# Patient Record
Sex: FEMALE | Race: WHITE | Employment: OTHER | URBAN - METROPOLITAN AREA
[De-identification: names, ages, dates, MRNs, and addresses within clinical notes are randomized per-mention and may not be internally consistent; named-entity substitution may affect disease eponyms.]

---

## 2021-03-19 ENCOUNTER — APPOINTMENT (OUTPATIENT)
Dept: GENERAL RADIOLOGY | Age: 71
End: 2021-03-19
Payer: MEDICARE

## 2021-03-19 ENCOUNTER — HOSPITAL ENCOUNTER (EMERGENCY)
Age: 71
Discharge: HOME | End: 2021-03-19
Attending: EMERGENCY MEDICINE
Payer: MEDICARE

## 2021-03-19 VITALS
TEMPERATURE: 97.6 F | HEIGHT: 61 IN | WEIGHT: 195 LBS | OXYGEN SATURATION: 98 % | SYSTOLIC BLOOD PRESSURE: 180 MMHG | BODY MASS INDEX: 36.82 KG/M2 | DIASTOLIC BLOOD PRESSURE: 81 MMHG | RESPIRATION RATE: 16 BRPM | HEART RATE: 74 BPM

## 2021-03-19 DIAGNOSIS — I49.3 SYMPTOMATIC PVCS: ICD-10-CM

## 2021-03-19 DIAGNOSIS — R00.2 PALPITATIONS: Primary | ICD-10-CM

## 2021-03-19 LAB
ALBUMIN SERPL-MCNC: 4.4 G/DL (ref 3.5–5.2)
ALP BLD-CCNC: 105 U/L (ref 35–104)
ALT SERPL-CCNC: 26 U/L (ref 0–32)
ANION GAP SERPL CALCULATED.3IONS-SCNC: 10 MMOL/L (ref 7–16)
AST SERPL-CCNC: 27 U/L (ref 0–31)
BASOPHILS ABSOLUTE: 0.04 E9/L (ref 0–0.2)
BASOPHILS RELATIVE PERCENT: 0.4 % (ref 0–2)
BILIRUB SERPL-MCNC: 0.5 MG/DL (ref 0–1.2)
BUN BLDV-MCNC: 16 MG/DL (ref 8–23)
CALCIUM SERPL-MCNC: 10.4 MG/DL (ref 8.6–10.2)
CHLORIDE BLD-SCNC: 99 MMOL/L (ref 98–107)
CO2: 29 MMOL/L (ref 22–29)
CREAT SERPL-MCNC: 0.7 MG/DL (ref 0.5–1)
EKG ATRIAL RATE: 75 BPM
EKG P AXIS: 42 DEGREES
EKG P-R INTERVAL: 162 MS
EKG Q-T INTERVAL: 398 MS
EKG QRS DURATION: 96 MS
EKG QTC CALCULATION (BAZETT): 444 MS
EKG R AXIS: -4 DEGREES
EKG T AXIS: 14 DEGREES
EKG VENTRICULAR RATE: 75 BPM
EOSINOPHILS ABSOLUTE: 0.14 E9/L (ref 0.05–0.5)
EOSINOPHILS RELATIVE PERCENT: 1.5 % (ref 0–6)
GFR AFRICAN AMERICAN: >60
GFR NON-AFRICAN AMERICAN: >60 ML/MIN/1.73
GLUCOSE BLD-MCNC: 105 MG/DL (ref 74–99)
HCT VFR BLD CALC: 47.6 % (ref 34–48)
HEMOGLOBIN: 15.7 G/DL (ref 11.5–15.5)
IMMATURE GRANULOCYTES #: 0.03 E9/L
IMMATURE GRANULOCYTES %: 0.3 % (ref 0–5)
LYMPHOCYTES ABSOLUTE: 1.72 E9/L (ref 1.5–4)
LYMPHOCYTES RELATIVE PERCENT: 18.1 % (ref 20–42)
MAGNESIUM: 2 MG/DL (ref 1.6–2.6)
MCH RBC QN AUTO: 29.4 PG (ref 26–35)
MCHC RBC AUTO-ENTMCNC: 33 % (ref 32–34.5)
MCV RBC AUTO: 89.1 FL (ref 80–99.9)
MONOCYTES ABSOLUTE: 0.71 E9/L (ref 0.1–0.95)
MONOCYTES RELATIVE PERCENT: 7.5 % (ref 2–12)
NEUTROPHILS ABSOLUTE: 6.86 E9/L (ref 1.8–7.3)
NEUTROPHILS RELATIVE PERCENT: 72.2 % (ref 43–80)
PDW BLD-RTO: 13 FL (ref 11.5–15)
PLATELET # BLD: 276 E9/L (ref 130–450)
PMV BLD AUTO: 10.9 FL (ref 7–12)
POTASSIUM SERPL-SCNC: 4.1 MMOL/L (ref 3.5–5)
RBC # BLD: 5.34 E12/L (ref 3.5–5.5)
SODIUM BLD-SCNC: 138 MMOL/L (ref 132–146)
TOTAL PROTEIN: 7.7 G/DL (ref 6.4–8.3)
TROPONIN: <0.01 NG/ML (ref 0–0.03)
TSH SERPL DL<=0.05 MIU/L-ACNC: 2.67 UIU/ML (ref 0.27–4.2)
WBC # BLD: 9.5 E9/L (ref 4.5–11.5)

## 2021-03-19 PROCEDURE — 93005 ELECTROCARDIOGRAM TRACING: CPT | Performed by: EMERGENCY MEDICINE

## 2021-03-19 PROCEDURE — 99283 EMERGENCY DEPT VISIT LOW MDM: CPT

## 2021-03-19 PROCEDURE — 84443 ASSAY THYROID STIM HORMONE: CPT

## 2021-03-19 PROCEDURE — 85025 COMPLETE CBC W/AUTO DIFF WBC: CPT

## 2021-03-19 PROCEDURE — 84484 ASSAY OF TROPONIN QUANT: CPT

## 2021-03-19 PROCEDURE — 36415 COLL VENOUS BLD VENIPUNCTURE: CPT

## 2021-03-19 PROCEDURE — 83735 ASSAY OF MAGNESIUM: CPT

## 2021-03-19 PROCEDURE — 71045 X-RAY EXAM CHEST 1 VIEW: CPT

## 2021-03-19 PROCEDURE — 80053 COMPREHEN METABOLIC PANEL: CPT

## 2021-03-19 PROCEDURE — 93010 ELECTROCARDIOGRAM REPORT: CPT | Performed by: INTERNAL MEDICINE

## 2021-03-19 RX ORDER — BIOTIN 10 MG
150 TABLET ORAL DAILY
COMMUNITY

## 2021-03-19 RX ORDER — M-VIT,TX,IRON,MINS/CALC/FOLIC 27MG-0.4MG
1 TABLET ORAL 2 TIMES DAILY
COMMUNITY

## 2021-03-19 RX ORDER — CALCIUM CARBONATE 500(1250)
500 TABLET ORAL DAILY
COMMUNITY

## 2021-03-19 RX ORDER — HYDROCHLOROTHIAZIDE 12.5 MG/1
12.5 CAPSULE, GELATIN COATED ORAL DAILY
COMMUNITY

## 2021-03-19 RX ORDER — MULTIVIT WITH MINERALS/LUTEIN
1000 TABLET ORAL DAILY
COMMUNITY

## 2021-03-19 RX ORDER — BACILLUS COAGULANS/INULIN 1B-250 MG
CAPSULE ORAL
COMMUNITY

## 2021-03-19 RX ORDER — MAGNESIUM OXIDE 400 MG/1
400 TABLET ORAL DAILY
COMMUNITY

## 2021-03-19 RX ORDER — UBIDECARENONE 75 MG
50 CAPSULE ORAL
COMMUNITY

## 2021-03-19 NOTE — ED PROVIDER NOTES
Taraslil[address] Yemih[address]  Department of Emergency Medicine   ED  Encounter Note  Admit Date/RoomTime: 3/19/2021  2:26 PM  ED Room: [facility]/[place] [address]    NAME: Jacqulin Closs  : 1950  MRN: [id number]     Chief Complaint:  Irregular Heart Beat (since tuesday intermittently)    History of Present Illness        Mary Rodriguez is a 69 y.o. old female who presents to the emergency department for evaluation of intermittent palpitations/irregular heartbeat for the past 4 days. She has had no prior history of arrhythmia or premature beats. She used to follow with a cardiologist in [facility] for bradycardia. She just moved here recently and has an appointment with a new family doctor, Dr. Karen Pak on Monday. She stated she was concerned to wait until after the weekend in case something was wrong with her heart. She denies any chest pain or pressure but does feel a fluttering. She states that when she checks her pulse, it feels irregular. She has no shortness of breath or leg swelling. No recent medication changes. .  ROS   Pertinent positives and negatives are stated within HPI, all other systems reviewed and are negative. Past Medical History:  has a past medical history of Headache and Hypertension. Surgical History:  has a past surgical history that includes Gastric bypass surgery (); joint replacement; Breast surgery; Hysterectomy; and rhinoplasty (). Social History:  reports that she has never smoked. She has never used smokeless tobacco. She reports that she does not drink alcohol or use drugs. Family History: family history is not on file. Allergies: Patient has no known allergies.     Physical Exam   Oxygen Saturation Interpretation: Normal.        ED Triage Vitals [21 1330]   BP Temp Temp Source Pulse Resp SpO2 Height Weight   (!) 180/81 97.6 °F (36.4 °C) Temporal 74 16 98 % 5' 1\" (1.549 m) 195 lb (88.5 kg)         · General Appearance/Constitutional: details for the plan of care and counseling regarding the diagnosis and prognosis. Questions are answered at this time and are agreeable with the plan. Assessment      1. Palpitations    2. Symptomatic PVCs      This patient's ED course included: a personal history and physicial examination  This patient has remained hemodynamically stable during their ED course. Plan   Discharge home. Patient condition is stable. New Medications     Discharge Medication List as of 3/19/2021  4:56 PM        Electronically signed by Ángel Zheng DO   DD: 3/19/21  **This report was transcribed using voice recognition software. Every effort was made to ensure accuracy; however, inadvertent computerized transcription errors may be present.   END OF PROVIDER NOTE        Ángel Zheng DO  03/19/21 6807

## 2021-03-19 NOTE — ED NOTES
Bed:  [place] [address]  Expected date:   Expected time:   Means of arrival:   Comments:  Meche Thorne RN  03/19/21 5042

## 2021-04-08 ENCOUNTER — HOSPITAL ENCOUNTER (OUTPATIENT)
Dept: NON INVASIVE DIAGNOSTICS | Age: 71
Discharge: HOME | End: 2021-04-08
Payer: MEDICARE

## 2021-04-08 ENCOUNTER — HOSPITAL ENCOUNTER (OUTPATIENT)
Dept: NUCLEAR MEDICINE | Age: 71
Discharge: HOME | End: 2021-04-08
Payer: MEDICARE

## 2021-04-08 VITALS — BODY MASS INDEX: 36.82 KG/M2 | WEIGHT: 195 LBS | HEIGHT: 61 IN

## 2021-04-08 LAB
LV EF: 63 %
LV EF: 84 %
LVEF MODALITY: NORMAL
LVEF MODALITY: NORMAL

## 2021-04-08 PROCEDURE — 93017 CV STRESS TEST TRACING ONLY: CPT

## 2021-04-08 PROCEDURE — 78452 HT MUSCLE IMAGE SPECT MULT: CPT

## 2021-04-08 PROCEDURE — 3430000000 HC RX DIAGNOSTIC RADIOPHARMACEUTICAL: Performed by: RADIOLOGY

## 2021-04-08 PROCEDURE — 78452 HT MUSCLE IMAGE SPECT MULT: CPT | Performed by: INTERNAL MEDICINE

## 2021-04-08 PROCEDURE — 93018 CV STRESS TEST I&R ONLY: CPT | Performed by: INTERNAL MEDICINE

## 2021-04-08 PROCEDURE — A9500 TC99M SESTAMIBI: HCPCS | Performed by: RADIOLOGY

## 2021-04-08 PROCEDURE — 93306 TTE W/DOPPLER COMPLETE: CPT

## 2021-04-08 PROCEDURE — 93016 CV STRESS TEST SUPVJ ONLY: CPT | Performed by: INTERNAL MEDICINE

## 2021-04-08 RX ADMIN — Medication 10 MILLICURIE: at 08:59

## 2021-04-08 RX ADMIN — Medication 30 MILLICURIE: at 08:59

## 2021-04-08 NOTE — PROCEDURES
Exercise Nuclear Stress Test:    Cardiologist: Dr. Wali Gaitan EKG: NSR, low voltage precordial leads, poor R wave progression, abnormal EKG    Indications for study: Chest pain     Exercise stress test was performed using the Armani Protocol   No chest pain   Exercise time: 6 min , METs: 7, MPHR: 86%, Duke treadmill score: 6   No new arrhythmias   No EKG changes suggestive of stress induced ischemia   Average functional capacity   There was an appropriate BP and heart response to exercise and recovery   Low risk exercise stress test.    Nuclear images pending    Yasmine Randhawa MD., [address] [facility]) Cardiology

## 2021-04-08 NOTE — PROGRESS NOTES
Nuclear Treadmill Stress Test completed by Dr. Blair Hicks, nurses, nuclear tech all wearing procedural masks during procedure as per protocol.

## 2023-10-11 ENCOUNTER — HOSPITAL ENCOUNTER (INPATIENT)
Age: 73
LOS: 3 days | Discharge: HOME | End: 2023-10-14
Attending: EMERGENCY MEDICINE | Admitting: GENERAL PRACTICE
Payer: MEDICARE

## 2023-10-11 ENCOUNTER — APPOINTMENT (OUTPATIENT)
Dept: CT IMAGING | Age: 73
End: 2023-10-11
Payer: MEDICARE

## 2023-10-11 DIAGNOSIS — N30.00 ACUTE CYSTITIS WITHOUT HEMATURIA: ICD-10-CM

## 2023-10-11 DIAGNOSIS — K57.32 DIVERTICULITIS OF COLON: Primary | ICD-10-CM

## 2023-10-11 DIAGNOSIS — A41.9 SEPTICEMIA (HCC): ICD-10-CM

## 2023-10-11 PROBLEM — K57.92 DIVERTICULITIS: Status: ACTIVE | Noted: 2023-10-11

## 2023-10-11 LAB
ALBUMIN SERPL-MCNC: 4.1 G/DL (ref 3.5–5.2)
ALP SERPL-CCNC: 112 U/L (ref 35–104)
ALT SERPL-CCNC: 22 U/L (ref 0–32)
ANION GAP SERPL CALCULATED.3IONS-SCNC: 13 MMOL/L (ref 7–16)
AST SERPL-CCNC: 39 U/L (ref 0–31)
ATYPICAL LYMPHOCYTE ABSOLUTE COUNT: 1.82 K/UL (ref 0–0.46)
ATYPICAL LYMPHOCYTES: 8 % (ref 0–4)
BASOPHILS # BLD: 0 K/UL (ref 0–0.2)
BASOPHILS NFR BLD: 0 % (ref 0–2)
BILIRUB SERPL-MCNC: 1.3 MG/DL (ref 0–1.2)
BILIRUB UR QL STRIP: ABNORMAL
BUN SERPL-MCNC: 18 MG/DL (ref 6–23)
CALCIUM SERPL-MCNC: 10 MG/DL (ref 8.6–10.2)
CHLORIDE SERPL-SCNC: 92 MMOL/L (ref 98–107)
CLARITY UR: ABNORMAL
CO2 SERPL-SCNC: 27 MMOL/L (ref 22–29)
COLOR UR: YELLOW
CREAT SERPL-MCNC: 0.9 MG/DL (ref 0.5–1)
EOSINOPHIL # BLD: 0 K/UL (ref 0.05–0.5)
EOSINOPHILS RELATIVE PERCENT: 0 % (ref 0–6)
EPI CELLS #/AREA URNS HPF: ABNORMAL /HPF
ERYTHROCYTE [DISTWIDTH] IN BLOOD BY AUTOMATED COUNT: 13.4 % (ref 11.5–15)
GFR SERPL CREATININE-BSD FRML MDRD: >60 ML/MIN/1.73M2
GLUCOSE SERPL-MCNC: 123 MG/DL (ref 74–99)
GLUCOSE UR STRIP-MCNC: NEGATIVE MG/DL
HCT VFR BLD AUTO: 44.5 % (ref 34–48)
HGB BLD-MCNC: 14.9 G/DL (ref 11.5–15.5)
HGB UR QL STRIP.AUTO: ABNORMAL
KETONES UR STRIP-MCNC: 15 MG/DL
LACTATE BLDV-SCNC: 1.6 MMOL/L (ref 0.5–2.2)
LEUKOCYTE ESTERASE UR QL STRIP: ABNORMAL
LIPASE SERPL-CCNC: 18 U/L (ref 13–60)
LYMPHOCYTES NFR BLD: 0.4 K/UL (ref 1.5–4)
LYMPHOCYTES RELATIVE PERCENT: 2 % (ref 20–42)
MCH RBC QN AUTO: 29.6 PG (ref 26–35)
MCHC RBC AUTO-ENTMCNC: 33.5 G/DL (ref 32–34.5)
MCV RBC AUTO: 88.3 FL (ref 80–99.9)
MONOCYTES NFR BLD: 1.61 K/UL (ref 0.1–0.95)
MONOCYTES NFR BLD: 7 % (ref 2–12)
NEUTROPHILS NFR BLD: 84 % (ref 43–80)
NEUTS SEG NFR BLD: 19.37 K/UL (ref 1.8–7.3)
NITRITE UR QL STRIP: NEGATIVE
PH UR STRIP: 6 [PH] (ref 5–9)
PLATELET # BLD AUTO: 259 K/UL (ref 130–450)
PMV BLD AUTO: 10.2 FL (ref 7–12)
POTASSIUM SERPL-SCNC: 3.9 MMOL/L (ref 3.5–5)
PROT SERPL-MCNC: 7.5 G/DL (ref 6.4–8.3)
PROT UR STRIP-MCNC: 100 MG/DL
RBC # BLD AUTO: 5.04 M/UL (ref 3.5–5.5)
RBC # BLD: NORMAL 10*6/UL
RBC #/AREA URNS HPF: ABNORMAL /HPF
SARS-COV-2 RDRP RESP QL NAA+PROBE: NOT DETECTED
SODIUM SERPL-SCNC: 132 MMOL/L (ref 132–146)
SP GR UR STRIP: 1.02 (ref 1–1.03)
SPECIMEN DESCRIPTION: NORMAL
UROBILINOGEN UR STRIP-ACNC: 0.2 EU/DL (ref 0–1)
WBC #/AREA URNS HPF: ABNORMAL /HPF
WBC OTHER # BLD: 23.2 K/UL (ref 4.5–11.5)
YEAST URNS QL MICRO: PRESENT

## 2023-10-11 PROCEDURE — 83605 ASSAY OF LACTIC ACID: CPT

## 2023-10-11 PROCEDURE — 99285 EMERGENCY DEPT VISIT HI MDM: CPT

## 2023-10-11 PROCEDURE — 81001 URINALYSIS AUTO W/SCOPE: CPT

## 2023-10-11 PROCEDURE — 87635 SARS-COV-2 COVID-19 AMP PRB: CPT

## 2023-10-11 PROCEDURE — 87077 CULTURE AEROBIC IDENTIFY: CPT

## 2023-10-11 PROCEDURE — 2580000003 HC RX 258: Performed by: EMERGENCY MEDICINE

## 2023-10-11 PROCEDURE — 6360000004 HC RX CONTRAST MEDICATION: Performed by: RADIOLOGY

## 2023-10-11 PROCEDURE — 36415 COLL VENOUS BLD VENIPUNCTURE: CPT

## 2023-10-11 PROCEDURE — 74177 CT ABD & PELVIS W/CONTRAST: CPT

## 2023-10-11 PROCEDURE — 87154 CUL TYP ID BLD PTHGN 6+ TRGT: CPT

## 2023-10-11 PROCEDURE — 85025 COMPLETE CBC W/AUTO DIFF WBC: CPT

## 2023-10-11 PROCEDURE — 80053 COMPREHEN METABOLIC PANEL: CPT

## 2023-10-11 PROCEDURE — 1200000000 HC SEMI PRIVATE

## 2023-10-11 PROCEDURE — 6370000000 HC RX 637 (ALT 250 FOR IP): Performed by: GENERAL PRACTICE

## 2023-10-11 PROCEDURE — 87086 URINE CULTURE/COLONY COUNT: CPT

## 2023-10-11 PROCEDURE — 6360000002 HC RX W HCPCS: Performed by: EMERGENCY MEDICINE

## 2023-10-11 PROCEDURE — 2580000003 HC RX 258: Performed by: GENERAL PRACTICE

## 2023-10-11 PROCEDURE — 85652 RBC SED RATE AUTOMATED: CPT

## 2023-10-11 PROCEDURE — 87040 BLOOD CULTURE FOR BACTERIA: CPT

## 2023-10-11 PROCEDURE — 83690 ASSAY OF LIPASE: CPT

## 2023-10-11 PROCEDURE — 2580000003 HC RX 258: Performed by: RADIOLOGY

## 2023-10-11 RX ORDER — DICYCLOMINE HYDROCHLORIDE 10 MG/ML
20 INJECTION INTRAMUSCULAR ONCE
Status: DISCONTINUED | OUTPATIENT
Start: 2023-10-11 | End: 2023-10-14 | Stop reason: HOSPADM

## 2023-10-11 RX ORDER — METRONIDAZOLE 500 MG/1
500 TABLET ORAL EVERY 8 HOURS SCHEDULED
Status: DISCONTINUED | OUTPATIENT
Start: 2023-10-12 | End: 2023-10-12

## 2023-10-11 RX ORDER — METRONIDAZOLE 500 MG/100ML
500 INJECTION, SOLUTION INTRAVENOUS ONCE
Status: COMPLETED | OUTPATIENT
Start: 2023-10-11 | End: 2023-10-11

## 2023-10-11 RX ORDER — ACETAMINOPHEN 500 MG
500 TABLET ORAL EVERY 4 HOURS PRN
Status: DISCONTINUED | OUTPATIENT
Start: 2023-10-11 | End: 2023-10-14 | Stop reason: HOSPADM

## 2023-10-11 RX ORDER — CHOLECALCIFEROL (VITAMIN D3) 1250 MCG
50000 CAPSULE ORAL DAILY
COMMUNITY

## 2023-10-11 RX ORDER — SODIUM CHLORIDE 9 MG/ML
INJECTION, SOLUTION INTRAVENOUS CONTINUOUS
Status: DISCONTINUED | OUTPATIENT
Start: 2023-10-11 | End: 2023-10-14 | Stop reason: HOSPADM

## 2023-10-11 RX ORDER — SODIUM CHLORIDE 0.9 % (FLUSH) 0.9 %
10 SYRINGE (ML) INJECTION PRN
Status: COMPLETED | OUTPATIENT
Start: 2023-10-11 | End: 2023-10-11

## 2023-10-11 RX ADMIN — ACETAMINOPHEN 500 MG: 500 TABLET ORAL at 23:29

## 2023-10-11 RX ADMIN — SODIUM CHLORIDE: 9 INJECTION, SOLUTION INTRAVENOUS at 23:30

## 2023-10-11 RX ADMIN — WATER 1000 MG: 1 INJECTION INTRAMUSCULAR; INTRAVENOUS; SUBCUTANEOUS at 20:50

## 2023-10-11 RX ADMIN — SODIUM CHLORIDE, PRESERVATIVE FREE 10 ML: 5 INJECTION INTRAVENOUS at 19:05

## 2023-10-11 RX ADMIN — IOPAMIDOL 75 ML: 755 INJECTION, SOLUTION INTRAVENOUS at 19:07

## 2023-10-11 RX ADMIN — METRONIDAZOLE 500 MG: 500 INJECTION, SOLUTION INTRAVENOUS at 20:55

## 2023-10-11 ASSESSMENT — PAIN - FUNCTIONAL ASSESSMENT
PAIN_FUNCTIONAL_ASSESSMENT: ACTIVITIES ARE NOT PREVENTED
PAIN_FUNCTIONAL_ASSESSMENT: 0-10
PAIN_FUNCTIONAL_ASSESSMENT: ACTIVITIES ARE NOT PREVENTED

## 2023-10-11 ASSESSMENT — PAIN DESCRIPTION - DESCRIPTORS
DESCRIPTORS: DISCOMFORT
DESCRIPTORS: ACHING
DESCRIPTORS: ACHING

## 2023-10-11 ASSESSMENT — PAIN DESCRIPTION - ORIENTATION
ORIENTATION: LEFT;LOWER

## 2023-10-11 ASSESSMENT — PAIN DESCRIPTION - LOCATION
LOCATION: ABDOMEN
LOCATION: ABDOMEN
LOCATION: ABDOMEN;BACK

## 2023-10-11 ASSESSMENT — PAIN SCALES - GENERAL
PAINLEVEL_OUTOF10: 1

## 2023-10-11 ASSESSMENT — LIFESTYLE VARIABLES
HOW MANY STANDARD DRINKS CONTAINING ALCOHOL DO YOU HAVE ON A TYPICAL DAY: PATIENT DOES NOT DRINK
HOW OFTEN DO YOU HAVE A DRINK CONTAINING ALCOHOL: NEVER

## 2023-10-11 NOTE — ED TRIAGE NOTES
Department of Emergency Medicine  FIRST PROVIDER TRIAGE NOTE             Independent MLP           10/11/23  2:47 PM EDT    Date of Encounter: 10/11/23   MRN: [id number]      HPI: Maria D Jin is a 75 y.o. female who presents to the ED for Abdominal Pain (LLQ, with fever,chills, joint pain), Fever (102), Chills, and Diarrhea  Patient reports that her symptoms only come \"at night\". ROS: Negative for cp, sob, back pain, cough, vomiting, urinary complaints, rash, headache, or dizziness. PE: Gen Appearance/Constitutional: alert  CV: regular rate  Pulm: CTA bilat  GI: soft and NT     Initial Plan of Care: All treatment areas with department are currently occupied. Plan to order/Initiate the following while awaiting opening in ED: labs and imaging studies.   Initiate Treatment-Testing, Proceed toTreatment Area When Bed Available for ED Attending/MLP to Continue Care    Electronically signed by MARINA Stahl   DD: 10/11/23

## 2023-10-12 PROBLEM — K57.32 DIVERTICULITIS OF COLON: Status: ACTIVE | Noted: 2023-10-12

## 2023-10-12 LAB
ALBUMIN SERPL-MCNC: 3.4 G/DL (ref 3.5–5.2)
ALP SERPL-CCNC: 85 U/L (ref 35–104)
ALT SERPL-CCNC: 18 U/L (ref 0–32)
ANION GAP SERPL CALCULATED.3IONS-SCNC: 11 MMOL/L (ref 7–16)
AST SERPL-CCNC: 36 U/L (ref 0–31)
ATYPICAL LYMPHOCYTE ABSOLUTE COUNT: 1.27 K/UL (ref 0–0.46)
ATYPICAL LYMPHOCYTES: 7 % (ref 0–4)
BASOPHILS # BLD: 0 K/UL (ref 0–0.2)
BASOPHILS NFR BLD: 0 % (ref 0–2)
BILIRUB SERPL-MCNC: 1 MG/DL (ref 0–1.2)
BUN SERPL-MCNC: 19 MG/DL (ref 6–23)
CALCIUM SERPL-MCNC: 9.2 MG/DL (ref 8.6–10.2)
CHLORIDE SERPL-SCNC: 99 MMOL/L (ref 98–107)
CO2 SERPL-SCNC: 24 MMOL/L (ref 22–29)
CREAT SERPL-MCNC: 0.8 MG/DL (ref 0.5–1)
EOSINOPHIL # BLD: 0 K/UL (ref 0.05–0.5)
EOSINOPHILS RELATIVE PERCENT: 0 % (ref 0–6)
ERYTHROCYTE [DISTWIDTH] IN BLOOD BY AUTOMATED COUNT: 13.3 % (ref 11.5–15)
ERYTHROCYTE [SEDIMENTATION RATE] IN BLOOD BY WESTERGREN METHOD: 27 MM/HR (ref 0–20)
GFR SERPL CREATININE-BSD FRML MDRD: >60 ML/MIN/1.73M2
GLUCOSE SERPL-MCNC: 110 MG/DL (ref 74–99)
HCT VFR BLD AUTO: 39.4 % (ref 34–48)
HGB BLD-MCNC: 13.2 G/DL (ref 11.5–15.5)
LYMPHOCYTES NFR BLD: 0.95 K/UL (ref 1.5–4)
LYMPHOCYTES RELATIVE PERCENT: 5 % (ref 20–42)
MCH RBC QN AUTO: 29.5 PG (ref 26–35)
MCHC RBC AUTO-ENTMCNC: 33.5 G/DL (ref 32–34.5)
MCV RBC AUTO: 87.9 FL (ref 80–99.9)
MONOCYTES NFR BLD: 1.91 K/UL (ref 0.1–0.95)
MONOCYTES NFR BLD: 11 % (ref 2–12)
NEUTROPHILS NFR BLD: 77 % (ref 43–80)
NEUTS SEG NFR BLD: 13.97 K/UL (ref 1.8–7.3)
PLATELET # BLD AUTO: 220 K/UL (ref 130–450)
PMV BLD AUTO: 10.6 FL (ref 7–12)
POTASSIUM SERPL-SCNC: 3.4 MMOL/L (ref 3.5–5)
PROT SERPL-MCNC: 6.5 G/DL (ref 6.4–8.3)
RBC # BLD AUTO: 4.48 M/UL (ref 3.5–5.5)
RBC # BLD: ABNORMAL 10*6/UL
SODIUM SERPL-SCNC: 134 MMOL/L (ref 132–146)
WBC # BLD: ABNORMAL 10*3/UL
WBC OTHER # BLD: 18.1 K/UL (ref 4.5–11.5)

## 2023-10-12 PROCEDURE — 80053 COMPREHEN METABOLIC PANEL: CPT

## 2023-10-12 PROCEDURE — 85025 COMPLETE CBC W/AUTO DIFF WBC: CPT

## 2023-10-12 PROCEDURE — 6370000000 HC RX 637 (ALT 250 FOR IP): Performed by: STUDENT IN AN ORGANIZED HEALTH CARE EDUCATION/TRAINING PROGRAM

## 2023-10-12 PROCEDURE — 6360000002 HC RX W HCPCS: Performed by: STUDENT IN AN ORGANIZED HEALTH CARE EDUCATION/TRAINING PROGRAM

## 2023-10-12 PROCEDURE — 99222 1ST HOSP IP/OBS MODERATE 55: CPT | Performed by: SURGERY

## 2023-10-12 PROCEDURE — 6370000000 HC RX 637 (ALT 250 FOR IP): Performed by: GENERAL PRACTICE

## 2023-10-12 PROCEDURE — 1200000000 HC SEMI PRIVATE

## 2023-10-12 PROCEDURE — 2580000003 HC RX 258: Performed by: STUDENT IN AN ORGANIZED HEALTH CARE EDUCATION/TRAINING PROGRAM

## 2023-10-12 RX ORDER — ASCORBIC ACID 500 MG
250 TABLET ORAL DAILY
Status: DISCONTINUED | OUTPATIENT
Start: 2023-10-12 | End: 2023-10-14 | Stop reason: HOSPADM

## 2023-10-12 RX ORDER — HYDROCHLOROTHIAZIDE 12.5 MG/1
12.5 TABLET ORAL DAILY
Status: DISCONTINUED | OUTPATIENT
Start: 2023-10-12 | End: 2023-10-14 | Stop reason: HOSPADM

## 2023-10-12 RX ORDER — M-VIT,TX,IRON,MINS/CALC/FOLIC 27MG-0.4MG
1 TABLET ORAL 2 TIMES DAILY
Status: DISCONTINUED | OUTPATIENT
Start: 2023-10-12 | End: 2023-10-14 | Stop reason: HOSPADM

## 2023-10-12 RX ORDER — METRONIDAZOLE 500 MG/1
500 TABLET ORAL EVERY 8 HOURS SCHEDULED
Status: DISCONTINUED | OUTPATIENT
Start: 2023-10-12 | End: 2023-10-14 | Stop reason: HOSPADM

## 2023-10-12 RX ADMIN — METRONIDAZOLE 500 MG: 500 TABLET ORAL at 21:30

## 2023-10-12 RX ADMIN — MULTIPLE VITAMINS W/ MINERALS TAB 1 TABLET: TAB at 08:56

## 2023-10-12 RX ADMIN — ACETAMINOPHEN 500 MG: 500 TABLET ORAL at 08:57

## 2023-10-12 RX ADMIN — WATER 2000 MG: 1 INJECTION INTRAMUSCULAR; INTRAVENOUS; SUBCUTANEOUS at 21:30

## 2023-10-12 RX ADMIN — METRONIDAZOLE 500 MG: 500 TABLET ORAL at 05:45

## 2023-10-12 RX ADMIN — MULTIPLE VITAMINS W/ MINERALS TAB 1 TABLET: TAB at 21:38

## 2023-10-12 RX ADMIN — CALCIUM CARBONATE-VITAMIN D TAB 500 MG-200 UNIT 1 TABLET: 500-200 TAB at 08:56

## 2023-10-12 RX ADMIN — METRONIDAZOLE 500 MG: 500 TABLET ORAL at 15:44

## 2023-10-12 RX ADMIN — ACETAMINOPHEN 500 MG: 500 TABLET ORAL at 16:59

## 2023-10-12 RX ADMIN — OXYCODONE HYDROCHLORIDE AND ACETAMINOPHEN 250 MG: 500 TABLET ORAL at 08:59

## 2023-10-12 RX ADMIN — ACETAMINOPHEN 500 MG: 500 TABLET ORAL at 23:29

## 2023-10-12 ASSESSMENT — ENCOUNTER SYMPTOMS
SHORTNESS OF BREATH: 0
DIARRHEA: 1
ABDOMINAL PAIN: 1
EYE REDNESS: 0
NAUSEA: 0
VOMITING: 0

## 2023-10-12 ASSESSMENT — PAIN SCALES - GENERAL
PAINLEVEL_OUTOF10: 0

## 2023-10-12 NOTE — PROGRESS NOTES
Called put out to Dr. Kayli Rosales on admission orders and informed of temperature of 100.4. Orders received.  Electronically signed by Ivan Rivas RN on 10/11/2023 at 10:43 PM

## 2023-10-12 NOTE — PROGRESS NOTES
Medication list reviewed but some medications unable to be verified. Patient states she can have sister bring in medication ist tomorrow morning.  Electronically signed by Marie Goel RN on 10/11/2023 at 9:42 PM

## 2023-10-12 NOTE — ACP (ADVANCE CARE PLANNING)
Advance Care Planning   Healthcare Decision Maker:    Primary Decision Maker: Susy Malhotra - Brother/Sister - [phone]    Click here to complete Healthcare Decision Makers including selection of the Healthcare Decision Maker Relationship (ie \"Primary\"). Today we documented Decision Maker(s) consistent with Legal Next of Kin hierarchy.

## 2023-10-12 NOTE — CARE COORDINATION
Introduced my self and provided explanation of CM role to patient. Patient is awake, alert, and aware of current diagnosis and discharge planning. Patient is from home with sister independently. Patient has no DME at home. PCP is Dr. Vince Saul. Preferred pharmacy is [facility]. Patient states her discharge plan is HOME with no needs at this time. ID consulted, await assessment and plan. Patient will need followed and assisted with discharge planning as necessary.      Electronically signed by Geneva Silva RN on 10/12/2023 at 1:51 PM

## 2023-10-12 NOTE — PROGRESS NOTES
4 Eyes Skin Assessment     NAME:  Rhona Bender  YOB: 1950  MEDICAL RECORD NUMBER:  [id number]    The patient is being assessed for  Admission    I agree that at least one RN has performed a thorough Head to Toe Skin Assessment on the patient. ALL assessment sites listed below have been assessed. Areas assessed by both nurses:    Head, Face, Ears, Shoulders, Back, Chest, Arms, Elbows, Hands, Sacrum. Buttock, Coccyx, Ischium, Legs. Feet and Heels, and Under Medical Devices         Does the Patient have a Wound?  No noted wound(s)       Tera Prevention initiated by RN: No  Wound Care Orders initiated by RN: No    Pressure Injury (Stage 3,4, Unstageable, DTI, NWPT, and Complex wounds) if present, place Wound referral order by RN under : No    New Ostomies, if present place, Ostomy referral order under : No     Nurse 1 eSignature: Electronically signed by Rowan Traylor RN on 10/11/23 at 10:08 PM EDT    **SHARE this note so that the co-signing nurse can place an eSignature**    Nurse 2 eSignature: Electronically signed by Anahi Saunders RN on 10/11/23 at 10:09 PM EDT

## 2023-10-12 NOTE — PROGRESS NOTES
General Surgery consult put out to Dr. Wing Segovia via [facility] message.  Electronically signed by Isabel Eason RN on 10/11/2023 at 10:44 PM

## 2023-10-12 NOTE — CONSULTS
[facility]  [facility]    Consultation Note     Admit Date: 10/11/2023  3:45 PM    Reason for Consult:   diverticulitis/UTI    Attending Physician:  Jhonny Lang DO     Chief Complaint: left LQ abdominal pain/chills    HISTORY OF PRESENT ILLNESS:   The patient is a 75 y.o.  female not known to the Infectious Diseases service. The patient presented with abdominal pain/fever started 2 days ago. It gotten better before coming in. She has UTI in the past but never had diverticulitis. No diarrhea. Tolerating antibiotics well. Had colonoscopy long time ago. Since admission pt is febrile to 100.4, HS stable on RA. Labs showed white count of 23 improved to 18. Hgb is 13.2, platelet count is 135. Lfts showed ast of 36, alkphos is 112 improved to 85, BMP normal. Blood cx in process. Urine cx in process. UA showed pyuria. CT abdomen pelvis: Diverticulosis with sigmoid wall thickening and inflammatory stranding,  concerning for colitis/diverticulitis. Clinical correlation recommended. Heterogeneous left renal cortical enhancement, which could suggest  pyelonephritis. Clinical and laboratory correlation recommended. Nonobstructing left renal calculus. Mild left pelviectasis. Patient is on ceftriaxone/flagyl and I got consulted for antibiotic management.     Past Medical History:        Diagnosis Date    Headache     Hypertension      Past Surgical History:        Procedure Laterality Date    BREAST SURGERY      GASTRIC BYPASS SURGERY  2011    HYSTERECTOMY      Total    JOINT REPLACEMENT      RHINOPLASTY  1973     Current Medications:   Scheduled Meds:   hydroCHLOROthiazide  12.5 mg Oral Daily    oyster shell calcium w/D  1 tablet Oral Daily    ascorbic acid  250 mg Oral Daily    therapeutic multivitamin-minerals  1 tablet Oral BID    dicyclomine  20 mg IntraMUSCular Once    cefTRIAXone (ROCEPHIN) IV  1,000 mg IntraVENous Q24H    metroNIDAZOLE  500 mg Oral 3 times per day     Continuous results for input(s): \"CULTRESP\" in the last 72 hours. Assessment:  Sigmoid uncomplicated diverticulitis:   Left sided pyelonephritis:  Leucocytosis:      Plan:    Cont IV ceftriaxone increased dose to 2gr daily and oral flagyl   Awaiting urine cx  Monitor labs  Will follow with you    Thank you for having us see this patient in consultation. I will be discussing this case with the treating physicians.       Electronically signed by Medardo Woodard MD on 10/12/2023 at 12:38 PM

## 2023-10-12 NOTE — H&P
[address]                  [address] [facility], [address] [place] [address]                              HISTORY AND PHYSICAL    PATIENT NAME: Kimberlyn Cordero                  :        1950  MED REC NO:   [id number]                            ROOM:       0505  ACCOUNT NO:   [de-identified]                           ADMIT DATE: 10/11/2023  PROVIDER:     Enrike De Los Santos DO    HISTORY OF PRESENT ILLNESS:  The patient is a 69-year-old white female  who states, over a couple of days prior to admission, she was having  lower abdominal pain with some diarrheal bowel movements and fever of  about 102. She had chills as well and a little bit of back pain and was  mostly left lower quadrant and left flank. She came into the emergency  department, had an elevated white count of over 20,000. CAT scan was  consistent with diverticulitis, colitis. She received some Rocephin and  Flagyl and was subsequently admitted to the floor for surgical consult. Of course blood cultures and urine cultures were obtained. She may also  have a urinary tract infection. PAST MEDICAL HISTORY:  Consistent with hypertension, tachycardia, and  breast cancer. PAST SURGICAL HISTORY:  Consistent with breast surgery, gastric bypass  surgery, hysterectomy, bilateral knee replacement, and rhinoplasty. HOME MEDICATIONS:  Included cholecalciferol hydrochlorothiazide, calcium  carbonate, multiple vitamins, minerals, and Diovan. She was recently  prescribed metoprolol XL, however, has not yet started it. ALLERGIES:  She has no known allergies. FAMILY MEDICAL HISTORY:  Was noted and discussed. SOCIAL HISTORY:  She is a nonsmoker and nondrinker. Denies the use of  narcotic drugs. She is a retired critical care nurse. REVIEW OF SYSTEMS:  Negative for vertigo, cephalgia, ringing in the  ears, hearing loss, difficulty swallowing, hoarseness in her voice, or  bloody noses.   She has _____

## 2023-10-13 LAB
ALBUMIN SERPL-MCNC: 2.9 G/DL (ref 3.5–5.2)
ALP SERPL-CCNC: 94 U/L (ref 35–104)
ALT SERPL-CCNC: 19 U/L (ref 0–32)
ANION GAP SERPL CALCULATED.3IONS-SCNC: 9 MMOL/L (ref 7–16)
AST SERPL-CCNC: 36 U/L (ref 0–31)
BASOPHILS # BLD: 0.04 K/UL (ref 0–0.2)
BASOPHILS NFR BLD: 0 % (ref 0–2)
BILIRUB SERPL-MCNC: 0.4 MG/DL (ref 0–1.2)
BUN SERPL-MCNC: 13 MG/DL (ref 6–23)
CALCIUM SERPL-MCNC: 8.8 MG/DL (ref 8.6–10.2)
CHLORIDE SERPL-SCNC: 103 MMOL/L (ref 98–107)
CO2 SERPL-SCNC: 22 MMOL/L (ref 22–29)
CREAT SERPL-MCNC: 0.7 MG/DL (ref 0.5–1)
EOSINOPHIL # BLD: 0.02 K/UL (ref 0.05–0.5)
EOSINOPHILS RELATIVE PERCENT: 0 % (ref 0–6)
ERYTHROCYTE [DISTWIDTH] IN BLOOD BY AUTOMATED COUNT: 13 % (ref 11.5–15)
GFR SERPL CREATININE-BSD FRML MDRD: >60 ML/MIN/1.73M2
GLUCOSE SERPL-MCNC: 112 MG/DL (ref 74–99)
HCT VFR BLD AUTO: 37.5 % (ref 34–48)
HGB BLD-MCNC: 12.7 G/DL (ref 11.5–15.5)
IMM GRANULOCYTES # BLD AUTO: 0.04 K/UL (ref 0–0.58)
IMM GRANULOCYTES NFR BLD: 0 % (ref 0–5)
LYMPHOCYTES NFR BLD: 1.45 K/UL (ref 1.5–4)
LYMPHOCYTES RELATIVE PERCENT: 15 % (ref 20–42)
MCH RBC QN AUTO: 30 PG (ref 26–35)
MCHC RBC AUTO-ENTMCNC: 33.9 G/DL (ref 32–34.5)
MCV RBC AUTO: 88.4 FL (ref 80–99.9)
MONOCYTES NFR BLD: 0.99 K/UL (ref 0.1–0.95)
MONOCYTES NFR BLD: 10 % (ref 2–12)
NEUTROPHILS NFR BLD: 75 % (ref 43–80)
NEUTS SEG NFR BLD: 7.44 K/UL (ref 1.8–7.3)
PLATELET # BLD AUTO: 190 K/UL (ref 130–450)
PMV BLD AUTO: 10.4 FL (ref 7–12)
POTASSIUM SERPL-SCNC: 3.5 MMOL/L (ref 3.5–5)
PROT SERPL-MCNC: 6.1 G/DL (ref 6.4–8.3)
RBC # BLD AUTO: 4.24 M/UL (ref 3.5–5.5)
SODIUM SERPL-SCNC: 134 MMOL/L (ref 132–146)
WBC OTHER # BLD: 10 K/UL (ref 4.5–11.5)

## 2023-10-13 PROCEDURE — 6370000000 HC RX 637 (ALT 250 FOR IP): Performed by: STUDENT IN AN ORGANIZED HEALTH CARE EDUCATION/TRAINING PROGRAM

## 2023-10-13 PROCEDURE — 85025 COMPLETE CBC W/AUTO DIFF WBC: CPT

## 2023-10-13 PROCEDURE — 87449 NOS EACH ORGANISM AG IA: CPT

## 2023-10-13 PROCEDURE — 6360000002 HC RX W HCPCS: Performed by: STUDENT IN AN ORGANIZED HEALTH CARE EDUCATION/TRAINING PROGRAM

## 2023-10-13 PROCEDURE — 36415 COLL VENOUS BLD VENIPUNCTURE: CPT

## 2023-10-13 PROCEDURE — 80053 COMPREHEN METABOLIC PANEL: CPT

## 2023-10-13 PROCEDURE — 99232 SBSQ HOSP IP/OBS MODERATE 35: CPT | Performed by: SURGERY

## 2023-10-13 PROCEDURE — 87324 CLOSTRIDIUM AG IA: CPT

## 2023-10-13 PROCEDURE — 6370000000 HC RX 637 (ALT 250 FOR IP): Performed by: GENERAL PRACTICE

## 2023-10-13 PROCEDURE — 2580000003 HC RX 258: Performed by: GENERAL PRACTICE

## 2023-10-13 PROCEDURE — 1200000000 HC SEMI PRIVATE

## 2023-10-13 PROCEDURE — 2580000003 HC RX 258: Performed by: STUDENT IN AN ORGANIZED HEALTH CARE EDUCATION/TRAINING PROGRAM

## 2023-10-13 RX ADMIN — METRONIDAZOLE 500 MG: 500 TABLET ORAL at 06:04

## 2023-10-13 RX ADMIN — WATER 2000 MG: 1 INJECTION INTRAMUSCULAR; INTRAVENOUS; SUBCUTANEOUS at 21:03

## 2023-10-13 RX ADMIN — CALCIUM CARBONATE-VITAMIN D TAB 500 MG-200 UNIT 1 TABLET: 500-200 TAB at 08:27

## 2023-10-13 RX ADMIN — METRONIDAZOLE 500 MG: 500 TABLET ORAL at 14:30

## 2023-10-13 RX ADMIN — MULTIPLE VITAMINS W/ MINERALS TAB 1 TABLET: TAB at 21:04

## 2023-10-13 RX ADMIN — MULTIPLE VITAMINS W/ MINERALS TAB 1 TABLET: TAB at 08:27

## 2023-10-13 RX ADMIN — OXYCODONE HYDROCHLORIDE AND ACETAMINOPHEN 250 MG: 500 TABLET ORAL at 08:27

## 2023-10-13 RX ADMIN — ACETAMINOPHEN 500 MG: 500 TABLET ORAL at 08:27

## 2023-10-13 RX ADMIN — SODIUM CHLORIDE: 9 INJECTION, SOLUTION INTRAVENOUS at 14:59

## 2023-10-13 RX ADMIN — ACETAMINOPHEN 500 MG: 500 TABLET ORAL at 18:48

## 2023-10-13 RX ADMIN — METRONIDAZOLE 500 MG: 500 TABLET ORAL at 22:12

## 2023-10-13 RX ADMIN — HYDROCHLOROTHIAZIDE 12.5 MG: 12.5 TABLET ORAL at 08:26

## 2023-10-13 NOTE — PATIENT CARE CONFERENCE
P Quality Flow/Interdisciplinary Rounds Progress Note        Quality Flow Rounds held on October 13, 2023    Disciplines Attending:  Bedside Nurse, , , and Nursing Unit [address] was admitted on 10/11/2023  3:45 PM    Anticipated Discharge Date:       Disposition:    Tera Score:  Tera Scale Score: 22    Readmission Risk              Risk of Unplanned Readmission:  7           Discussed patient goal for the day, patient clinical progression, and barriers to discharge.   The following Goal(s) of the Day/Commitment(s) have been identified:  Labs - Report Results      Memo Bender RN  October 13, 2023

## 2023-10-13 NOTE — CARE COORDINATION
Updated plan of care. Patient is from home with sister independently. Patient has no DME at home. Patient states her discharge plan is HOME with no needs at this time. ID consulted, Continue ceftriaxone and PO flagyl. Awaiting final stool cultures. Patient will need followed and assisted with discharge planning as necessary.      Electronically signed by Prince Chang RN on 10/13/2023 at 3:44 PM

## 2023-10-14 VITALS
OXYGEN SATURATION: 93 % | TEMPERATURE: 98.9 F | HEART RATE: 69 BPM | BODY MASS INDEX: 37.19 KG/M2 | HEIGHT: 61 IN | SYSTOLIC BLOOD PRESSURE: 128 MMHG | RESPIRATION RATE: 18 BRPM | DIASTOLIC BLOOD PRESSURE: 59 MMHG | WEIGHT: 197 LBS

## 2023-10-14 LAB
ALBUMIN SERPL-MCNC: 3 G/DL (ref 3.5–5.2)
ALP SERPL-CCNC: 80 U/L (ref 35–104)
ALT SERPL-CCNC: 18 U/L (ref 0–32)
ANION GAP SERPL CALCULATED.3IONS-SCNC: 9 MMOL/L (ref 7–16)
AST SERPL-CCNC: 28 U/L (ref 0–31)
BASOPHILS # BLD: 0.04 K/UL (ref 0–0.2)
BASOPHILS NFR BLD: 1 % (ref 0–2)
BILIRUB SERPL-MCNC: 0.3 MG/DL (ref 0–1.2)
BUN SERPL-MCNC: 10 MG/DL (ref 6–23)
C DIFF GDH + TOXINS A+B STL QL IA.RAPID: NEGATIVE
CALCIUM SERPL-MCNC: 8.6 MG/DL (ref 8.6–10.2)
CHLORIDE SERPL-SCNC: 103 MMOL/L (ref 98–107)
CO2 SERPL-SCNC: 24 MMOL/L (ref 22–29)
CREAT SERPL-MCNC: 0.7 MG/DL (ref 0.5–1)
EOSINOPHIL # BLD: 0.2 K/UL (ref 0.05–0.5)
EOSINOPHILS RELATIVE PERCENT: 3 % (ref 0–6)
ERYTHROCYTE [DISTWIDTH] IN BLOOD BY AUTOMATED COUNT: 12.9 % (ref 11.5–15)
GFR SERPL CREATININE-BSD FRML MDRD: >60 ML/MIN/1.73M2
GLUCOSE SERPL-MCNC: 99 MG/DL (ref 74–99)
HCT VFR BLD AUTO: 37.6 % (ref 34–48)
HGB BLD-MCNC: 12.7 G/DL (ref 11.5–15.5)
IMM GRANULOCYTES # BLD AUTO: <0.03 K/UL (ref 0–0.58)
IMM GRANULOCYTES NFR BLD: 0 % (ref 0–5)
LYMPHOCYTES NFR BLD: 1.77 K/UL (ref 1.5–4)
LYMPHOCYTES RELATIVE PERCENT: 27 % (ref 20–42)
MCH RBC QN AUTO: 29.7 PG (ref 26–35)
MCHC RBC AUTO-ENTMCNC: 33.8 G/DL (ref 32–34.5)
MCV RBC AUTO: 87.9 FL (ref 80–99.9)
MICROORGANISM SPEC CULT: ABNORMAL
MONOCYTES NFR BLD: 0.97 K/UL (ref 0.1–0.95)
MONOCYTES NFR BLD: 15 % (ref 2–12)
NEUTROPHILS NFR BLD: 55 % (ref 43–80)
NEUTS SEG NFR BLD: 3.63 K/UL (ref 1.8–7.3)
PLATELET # BLD AUTO: 217 K/UL (ref 130–450)
PMV BLD AUTO: 10.7 FL (ref 7–12)
POTASSIUM SERPL-SCNC: 3.2 MMOL/L (ref 3.5–5)
PROT SERPL-MCNC: 6 G/DL (ref 6.4–8.3)
RBC # BLD AUTO: 4.28 M/UL (ref 3.5–5.5)
SODIUM SERPL-SCNC: 136 MMOL/L (ref 132–146)
SPECIMEN DESCRIPTION: ABNORMAL
SPECIMEN DESCRIPTION: NORMAL
WBC OTHER # BLD: 6.6 K/UL (ref 4.5–11.5)

## 2023-10-14 PROCEDURE — 85025 COMPLETE CBC W/AUTO DIFF WBC: CPT

## 2023-10-14 PROCEDURE — 6370000000 HC RX 637 (ALT 250 FOR IP): Performed by: STUDENT IN AN ORGANIZED HEALTH CARE EDUCATION/TRAINING PROGRAM

## 2023-10-14 PROCEDURE — 6370000000 HC RX 637 (ALT 250 FOR IP): Performed by: GENERAL PRACTICE

## 2023-10-14 PROCEDURE — 36415 COLL VENOUS BLD VENIPUNCTURE: CPT

## 2023-10-14 PROCEDURE — 80053 COMPREHEN METABOLIC PANEL: CPT

## 2023-10-14 PROCEDURE — 99231 SBSQ HOSP IP/OBS SF/LOW 25: CPT | Performed by: STUDENT IN AN ORGANIZED HEALTH CARE EDUCATION/TRAINING PROGRAM

## 2023-10-14 RX ORDER — METRONIDAZOLE 500 MG/1
500 TABLET ORAL EVERY 8 HOURS SCHEDULED
Qty: 30 TABLET | Refills: 0 | Status: SHIPPED | OUTPATIENT
Start: 2023-10-14 | End: 2023-10-24

## 2023-10-14 RX ORDER — CEFDINIR 300 MG/1
300 CAPSULE ORAL 2 TIMES DAILY
Qty: 20 CAPSULE | Refills: 0 | Status: SHIPPED | OUTPATIENT
Start: 2023-10-14 | End: 2023-10-24

## 2023-10-14 RX ADMIN — METRONIDAZOLE 500 MG: 500 TABLET ORAL at 14:20

## 2023-10-14 RX ADMIN — MULTIPLE VITAMINS W/ MINERALS TAB 1 TABLET: TAB at 08:06

## 2023-10-14 RX ADMIN — CALCIUM CARBONATE-VITAMIN D TAB 500 MG-200 UNIT 1 TABLET: 500-200 TAB at 08:07

## 2023-10-14 RX ADMIN — OXYCODONE HYDROCHLORIDE AND ACETAMINOPHEN 250 MG: 500 TABLET ORAL at 08:06

## 2023-10-14 RX ADMIN — HYDROCHLOROTHIAZIDE 12.5 MG: 12.5 TABLET ORAL at 08:06

## 2023-10-14 RX ADMIN — ACETAMINOPHEN 500 MG: 500 TABLET ORAL at 08:06

## 2023-10-14 RX ADMIN — METRONIDAZOLE 500 MG: 500 TABLET ORAL at 06:24

## 2023-10-14 ASSESSMENT — PAIN SCALES - GENERAL: PAINLEVEL_OUTOF10: 0

## 2023-10-14 NOTE — PROGRESS NOTES
Discharge instructions explained to pt and copy given. Pt verbalized understanding an is in agreement with dc plan to home. Pt to be transported home with sister via private car not yet arrived.

## 2023-10-14 NOTE — PLAN OF CARE
Problem: Pain  Goal: Verbalizes/displays adequate comfort level or baseline comfort level  Outcome: Progressing  Flowsheets (Taken 10/13/2023 1415 by Ozzie Weldon RN)  Verbalizes/displays adequate comfort level or baseline comfort level:   Encourage patient to monitor pain and request assistance   Assess pain using appropriate pain scale   Administer analgesics based on type and severity of pain and evaluate response   Implement non-pharmacological measures as appropriate and evaluate response   Consider cultural and social influences on pain and pain management   Notify Licensed Independent Practitioner if interventions unsuccessful or patient reports new pain     Problem: Safety - Adult  Goal: Free from fall injury  Outcome: Progressing

## 2023-10-15 LAB
ACB COMPLEX DNA BLD POS QL NAA+NON-PROBE: NOT DETECTED
B FRAGILIS DNA BLD POS QL NAA+NON-PROBE: NOT DETECTED
BLACTX-M ISLT/SPM QL: NOT DETECTED
BLAIMP ISLT/SPM QL: NOT DETECTED
BLAKPC ISLT/SPM QL: NOT DETECTED
BLAOXA-48-LIKE ISLT/SPM QL: NOT DETECTED
BLAVIM ISLT/SPM QL: NOT DETECTED
C ALBICANS DNA BLD POS QL NAA+NON-PROBE: NOT DETECTED
C AURIS DNA BLD POS QL NAA+NON-PROBE: NOT DETECTED
C GATTII+NEOFOR DNA BLD POS QL NAA+N-PRB: NOT DETECTED
C GLABRATA DNA BLD POS QL NAA+NON-PROBE: NOT DETECTED
C KRUSEI DNA BLD POS QL NAA+NON-PROBE: NOT DETECTED
C PARAP DNA BLD POS QL NAA+NON-PROBE: NOT DETECTED
C TROPICLS DNA BLD POS QL NAA+NON-PROBE: NOT DETECTED
COLISTIN RES MCR-1 ISLT/SPM QL: NOT DETECTED
E CLOAC COMP DNA BLD POS NAA+NON-PROBE: NOT DETECTED
E COLI DNA BLD POS QL NAA+NON-PROBE: DETECTED
E FAECALIS DNA BLD POS QL NAA+NON-PROBE: NOT DETECTED
E FAECIUM DNA BLD POS QL NAA+NON-PROBE: NOT DETECTED
ENTEROBACTERALES DNA BLD POS NAA+N-PRB: DETECTED
GP B STREP DNA BLD POS QL NAA+NON-PROBE: NOT DETECTED
HAEM INFLU DNA BLD POS QL NAA+NON-PROBE: NOT DETECTED
K OXYTOCA DNA BLD POS QL NAA+NON-PROBE: NOT DETECTED
KLEBSIELLA SP DNA BLD POS QL NAA+NON-PRB: NOT DETECTED
KLEBSIELLA SP DNA BLD POS QL NAA+NON-PRB: NOT DETECTED
L MONOCYTOG DNA BLD POS QL NAA+NON-PROBE: NOT DETECTED
MICROORGANISM SPEC CULT: ABNORMAL
MICROORGANISM SPEC CULT: NORMAL
MICROORGANISM/AGENT SPEC: ABNORMAL
N MEN DNA BLD POS QL NAA+NON-PROBE: NOT DETECTED
P AERUGINOSA DNA BLD POS NAA+NON-PROBE: NOT DETECTED
PROTEUS SP DNA BLD POS QL NAA+NON-PROBE: NOT DETECTED
RESISTANT GENE NDM BY PCR: NOT DETECTED
S AUREUS DNA BLD POS QL NAA+NON-PROBE: NOT DETECTED
S AUREUS+CONS DNA BLD POS NAA+NON-PROBE: NOT DETECTED
S EPIDERMIDIS DNA BLD POS QL NAA+NON-PRB: NOT DETECTED
S LUGDUNENSIS DNA BLD POS QL NAA+NON-PRB: NOT DETECTED
S MALTOPHILIA DNA BLD POS QL NAA+NON-PRB: NOT DETECTED
S MARCESCENS DNA BLD POS NAA+NON-PROBE: NOT DETECTED
S PNEUM DNA BLD POS QL NAA+NON-PROBE: NOT DETECTED
S PYO DNA BLD POS QL NAA+NON-PROBE: NOT DETECTED
SALMONELLA DNA BLD POS QL NAA+NON-PROBE: NOT DETECTED
SERVICE CMNT-IMP: ABNORMAL
SERVICE CMNT-IMP: NORMAL
SPECIMEN DESCRIPTION: ABNORMAL
SPECIMEN DESCRIPTION: NORMAL
STREPTOCOCCUS DNA BLD POS NAA+NON-PROBE: NOT DETECTED

## 2023-10-17 LAB
MICROORGANISM SPEC CULT: NORMAL
SERVICE CMNT-IMP: NORMAL
SPECIMEN DESCRIPTION: NORMAL

## 2024-03-04 ENCOUNTER — HOSPITAL ENCOUNTER (OUTPATIENT)
Age: 74
Discharge: HOME | End: 2024-03-06

## 2024-03-04 PROCEDURE — 88305 TISSUE EXAM BY PATHOLOGIST: CPT

## 2024-03-06 LAB — SURGICAL PATHOLOGY REPORT: NORMAL

## 2024-03-31 ENCOUNTER — APPOINTMENT (OUTPATIENT)
Dept: CT IMAGING | Age: 74
End: 2024-03-31
Payer: MEDICARE

## 2024-03-31 ENCOUNTER — ANESTHESIA (OUTPATIENT)
Dept: OPERATING ROOM | Age: 74
End: 2024-03-31
Payer: MEDICARE

## 2024-03-31 ENCOUNTER — APPOINTMENT (OUTPATIENT)
Dept: GENERAL RADIOLOGY | Age: 74
End: 2024-03-31
Payer: MEDICARE

## 2024-03-31 ENCOUNTER — HOSPITAL ENCOUNTER (INPATIENT)
Age: 74
LOS: 5 days | Discharge: HOME | End: 2024-04-05
Attending: STUDENT IN AN ORGANIZED HEALTH CARE EDUCATION/TRAINING PROGRAM | Admitting: GENERAL PRACTICE
Payer: MEDICARE

## 2024-03-31 ENCOUNTER — ANESTHESIA EVENT (OUTPATIENT)
Dept: OPERATING ROOM | Age: 74
End: 2024-03-31
Payer: MEDICARE

## 2024-03-31 DIAGNOSIS — N13.9 OBSTRUCTIVE UROPATHY: ICD-10-CM

## 2024-03-31 DIAGNOSIS — N13.2 HYDRONEPHROSIS WITH URINARY OBSTRUCTION DUE TO RENAL CALCULUS: ICD-10-CM

## 2024-03-31 DIAGNOSIS — R10.9 FLANK PAIN: Primary | ICD-10-CM

## 2024-03-31 DIAGNOSIS — R31.9 URINARY TRACT INFECTION WITH HEMATURIA, SITE UNSPECIFIED: ICD-10-CM

## 2024-03-31 DIAGNOSIS — R65.21 SEPTIC SHOCK (HCC): ICD-10-CM

## 2024-03-31 DIAGNOSIS — A41.9 SEPTIC SHOCK (HCC): ICD-10-CM

## 2024-03-31 DIAGNOSIS — N39.0 URINARY TRACT INFECTION WITH HEMATURIA, SITE UNSPECIFIED: ICD-10-CM

## 2024-03-31 LAB
ALBUMIN SERPL-MCNC: 4.2 G/DL (ref 3.5–5.2)
ALP SERPL-CCNC: 124 U/L (ref 35–104)
ALT SERPL-CCNC: 25 U/L (ref 0–32)
ANION GAP SERPL CALCULATED.3IONS-SCNC: 14 MMOL/L (ref 7–16)
AST SERPL-CCNC: 32 U/L (ref 0–31)
ATYPICAL LYMPHOCYTE ABSOLUTE COUNT: 0.02 K/UL (ref 0–0.46)
ATYPICAL LYMPHOCYTES: 1 % (ref 0–4)
BACTERIA URNS QL MICRO: ABNORMAL
BASOPHILS # BLD: 0.02 K/UL (ref 0–0.2)
BASOPHILS NFR BLD: 1 % (ref 0–2)
BILIRUB SERPL-MCNC: 0.8 MG/DL (ref 0–1.2)
BILIRUB UR QL STRIP: NEGATIVE
BUN SERPL-MCNC: 27 MG/DL (ref 6–23)
CALCIUM SERPL-MCNC: 10.1 MG/DL (ref 8.6–10.2)
CHLORIDE SERPL-SCNC: 101 MMOL/L (ref 98–107)
CLARITY UR: ABNORMAL
CO2 SERPL-SCNC: 23 MMOL/L (ref 22–29)
COLOR UR: YELLOW
CREAT SERPL-MCNC: 0.9 MG/DL (ref 0.5–1)
EOSINOPHIL # BLD: 0 K/UL (ref 0.05–0.5)
EOSINOPHILS RELATIVE PERCENT: 0 % (ref 0–6)
ERYTHROCYTE [DISTWIDTH] IN BLOOD BY AUTOMATED COUNT: 12.9 % (ref 11.5–15)
GFR SERPL CREATININE-BSD FRML MDRD: 64 ML/MIN/1.73M2
GLUCOSE SERPL-MCNC: 98 MG/DL (ref 74–99)
GLUCOSE UR STRIP-MCNC: NEGATIVE MG/DL
HCT VFR BLD AUTO: 46.8 % (ref 34–48)
HGB BLD-MCNC: 15.1 G/DL (ref 11.5–15.5)
HGB UR QL STRIP.AUTO: ABNORMAL
INFLUENZA A BY PCR: NOT DETECTED
INFLUENZA B BY PCR: NOT DETECTED
KETONES UR STRIP-MCNC: NEGATIVE MG/DL
LACTATE BLDV-SCNC: 2.5 MMOL/L (ref 0.5–2.2)
LACTATE BLDV-SCNC: 3.2 MMOL/L (ref 0.5–2.2)
LEUKOCYTE ESTERASE UR QL STRIP: ABNORMAL
LIPASE SERPL-CCNC: 29 U/L (ref 13–60)
LYMPHOCYTES NFR BLD: 0.39 K/UL (ref 1.5–4)
LYMPHOCYTES RELATIVE PERCENT: 14 % (ref 20–42)
MCH RBC QN AUTO: 28.8 PG (ref 26–35)
MCHC RBC AUTO-ENTMCNC: 32.3 G/DL (ref 32–34.5)
MCV RBC AUTO: 89.3 FL (ref 80–99.9)
MONOCYTES NFR BLD: 0 % (ref 2–12)
MONOCYTES NFR BLD: 0 K/UL (ref 0.1–0.95)
NEUTROPHILS NFR BLD: 84 % (ref 43–80)
NEUTS SEG NFR BLD: 2.36 K/UL (ref 1.8–7.3)
NITRITE UR QL STRIP: POSITIVE
PH UR STRIP: 6 [PH] (ref 5–9)
PLATELET # BLD AUTO: 227 K/UL (ref 130–450)
PMV BLD AUTO: 10.4 FL (ref 7–12)
POTASSIUM SERPL-SCNC: 4 MMOL/L (ref 3.5–5)
PROT SERPL-MCNC: 7.1 G/DL (ref 6.4–8.3)
PROT UR STRIP-MCNC: ABNORMAL MG/DL
RBC # BLD AUTO: 5.24 M/UL (ref 3.5–5.5)
RBC # BLD: NORMAL 10*6/UL
RBC #/AREA URNS HPF: ABNORMAL /HPF
SARS-COV-2 RDRP RESP QL NAA+PROBE: NOT DETECTED
SODIUM SERPL-SCNC: 138 MMOL/L (ref 132–146)
SP GR UR STRIP: 1.02 (ref 1–1.03)
SPECIMEN DESCRIPTION: NORMAL
TROPONIN I SERPL HS-MCNC: 12 NG/L (ref 0–9)
TROPONIN I SERPL HS-MCNC: 24 NG/L (ref 0–9)
UROBILINOGEN UR STRIP-ACNC: 0.2 EU/DL (ref 0–1)
WBC #/AREA URNS HPF: ABNORMAL /HPF
WBC OTHER # BLD: 2.8 K/UL (ref 4.5–11.5)

## 2024-03-31 PROCEDURE — 6370000000 HC RX 637 (ALT 250 FOR IP)

## 2024-03-31 PROCEDURE — 02HV33Z INSERTION OF INFUSION DEVICE INTO SUPERIOR VENA CAVA, PERCUTANEOUS APPROACH: ICD-10-PCS | Performed by: GENERAL PRACTICE

## 2024-03-31 PROCEDURE — 87635 SARS-COV-2 COVID-19 AMP PRB: CPT

## 2024-03-31 PROCEDURE — 87154 CUL TYP ID BLD PTHGN 6+ TRGT: CPT

## 2024-03-31 PROCEDURE — 83605 ASSAY OF LACTIC ACID: CPT

## 2024-03-31 PROCEDURE — 80053 COMPREHEN METABOLIC PANEL: CPT

## 2024-03-31 PROCEDURE — 83690 ASSAY OF LIPASE: CPT

## 2024-03-31 PROCEDURE — 3700000001 HC ADD 15 MINUTES (ANESTHESIA): Performed by: UROLOGY

## 2024-03-31 PROCEDURE — 3E033XZ INTRODUCTION OF VASOPRESSOR INTO PERIPHERAL VEIN, PERCUTANEOUS APPROACH: ICD-10-PCS | Performed by: GENERAL PRACTICE

## 2024-03-31 PROCEDURE — 3600000013 HC SURGERY LEVEL 3 ADDTL 15MIN: Performed by: UROLOGY

## 2024-03-31 PROCEDURE — 99285 EMERGENCY DEPT VISIT HI MDM: CPT

## 2024-03-31 PROCEDURE — 2580000003 HC RX 258: Performed by: NURSE ANESTHETIST, CERTIFIED REGISTERED

## 2024-03-31 PROCEDURE — 6360000004 HC RX CONTRAST MEDICATION: Performed by: RADIOLOGY

## 2024-03-31 PROCEDURE — C1758 CATHETER, URETERAL: HCPCS | Performed by: UROLOGY

## 2024-03-31 PROCEDURE — C2617 STENT, NON-COR, TEM W/O DEL: HCPCS | Performed by: UROLOGY

## 2024-03-31 PROCEDURE — 2580000003 HC RX 258

## 2024-03-31 PROCEDURE — 99291 CRITICAL CARE FIRST HOUR: CPT

## 2024-03-31 PROCEDURE — 84145 PROCALCITONIN (PCT): CPT

## 2024-03-31 PROCEDURE — 2000000000 HC ICU R&B

## 2024-03-31 PROCEDURE — 6360000002 HC RX W HCPCS

## 2024-03-31 PROCEDURE — 87086 URINE CULTURE/COLONY COUNT: CPT

## 2024-03-31 PROCEDURE — 84484 ASSAY OF TROPONIN QUANT: CPT

## 2024-03-31 PROCEDURE — 87077 CULTURE AEROBIC IDENTIFY: CPT

## 2024-03-31 PROCEDURE — 3700000000 HC ANESTHESIA ATTENDED CARE: Performed by: UROLOGY

## 2024-03-31 PROCEDURE — 81001 URINALYSIS AUTO W/SCOPE: CPT

## 2024-03-31 PROCEDURE — C1769 GUIDE WIRE: HCPCS | Performed by: UROLOGY

## 2024-03-31 PROCEDURE — 2500000003 HC RX 250 WO HCPCS

## 2024-03-31 PROCEDURE — 87502 INFLUENZA DNA AMP PROBE: CPT

## 2024-03-31 PROCEDURE — 96375 TX/PRO/DX INJ NEW DRUG ADDON: CPT

## 2024-03-31 PROCEDURE — 93005 ELECTROCARDIOGRAM TRACING: CPT | Performed by: STUDENT IN AN ORGANIZED HEALTH CARE EDUCATION/TRAINING PROGRAM

## 2024-03-31 PROCEDURE — 86140 C-REACTIVE PROTEIN: CPT

## 2024-03-31 PROCEDURE — 96374 THER/PROPH/DIAG INJ IV PUSH: CPT

## 2024-03-31 PROCEDURE — 87040 BLOOD CULTURE FOR BACTERIA: CPT

## 2024-03-31 PROCEDURE — 82533 TOTAL CORTISOL: CPT

## 2024-03-31 PROCEDURE — 74177 CT ABD & PELVIS W/CONTRAST: CPT

## 2024-03-31 PROCEDURE — 2500000003 HC RX 250 WO HCPCS: Performed by: NURSE ANESTHETIST, CERTIFIED REGISTERED

## 2024-03-31 PROCEDURE — 85025 COMPLETE CBC W/AUTO DIFF WBC: CPT

## 2024-03-31 PROCEDURE — 6360000002 HC RX W HCPCS: Performed by: NURSE ANESTHETIST, CERTIFIED REGISTERED

## 2024-03-31 PROCEDURE — 2709999900 HC NON-CHARGEABLE SUPPLY: Performed by: UROLOGY

## 2024-03-31 PROCEDURE — 3600000003 HC SURGERY LEVEL 3 BASE: Performed by: UROLOGY

## 2024-03-31 PROCEDURE — 74420 UROGRAPHY RTRGR +-KUB: CPT

## 2024-03-31 DEVICE — URETERAL STENT
Type: IMPLANTABLE DEVICE | Site: URETER | Status: FUNCTIONAL
Brand: PERCUFLEX™

## 2024-03-31 RX ORDER — 0.9 % SODIUM CHLORIDE 0.9 %
1000 INTRAVENOUS SOLUTION INTRAVENOUS ONCE
Status: COMPLETED | OUTPATIENT
Start: 2024-03-31 | End: 2024-03-31

## 2024-03-31 RX ORDER — ENOXAPARIN SODIUM 100 MG/ML
40 INJECTION SUBCUTANEOUS DAILY
Status: DISCONTINUED | OUTPATIENT
Start: 2024-04-01 | End: 2024-04-05 | Stop reason: HOSPADM

## 2024-03-31 RX ORDER — ACETAMINOPHEN 650 MG/1
650 SUPPOSITORY RECTAL EVERY 6 HOURS PRN
Status: DISCONTINUED | OUTPATIENT
Start: 2024-03-31 | End: 2024-04-05 | Stop reason: HOSPADM

## 2024-03-31 RX ORDER — ONDANSETRON 4 MG/1
4 TABLET, ORALLY DISINTEGRATING ORAL EVERY 8 HOURS PRN
Status: DISCONTINUED | OUTPATIENT
Start: 2024-03-31 | End: 2024-04-05 | Stop reason: HOSPADM

## 2024-03-31 RX ORDER — SODIUM CHLORIDE 9 MG/ML
INJECTION, SOLUTION INTRAVENOUS PRN
Status: DISCONTINUED | OUTPATIENT
Start: 2024-03-31 | End: 2024-04-05 | Stop reason: HOSPADM

## 2024-03-31 RX ORDER — SODIUM CHLORIDE 9 MG/ML
INJECTION, SOLUTION INTRAVENOUS CONTINUOUS
Status: DISCONTINUED | OUTPATIENT
Start: 2024-04-01 | End: 2024-04-02

## 2024-03-31 RX ORDER — SODIUM CHLORIDE 0.9 % (FLUSH) 0.9 %
5-40 SYRINGE (ML) INJECTION EVERY 12 HOURS SCHEDULED
Status: DISCONTINUED | OUTPATIENT
Start: 2024-04-01 | End: 2024-04-05 | Stop reason: HOSPADM

## 2024-03-31 RX ORDER — SODIUM CHLORIDE 0.9 % (FLUSH) 0.9 %
5-40 SYRINGE (ML) INJECTION PRN
Status: DISCONTINUED | OUTPATIENT
Start: 2024-03-31 | End: 2024-04-05 | Stop reason: HOSPADM

## 2024-03-31 RX ORDER — ACETAMINOPHEN 500 MG
1000 TABLET ORAL ONCE
Status: COMPLETED | OUTPATIENT
Start: 2024-03-31 | End: 2024-03-31

## 2024-03-31 RX ORDER — ONDANSETRON 2 MG/ML
4 INJECTION INTRAMUSCULAR; INTRAVENOUS ONCE
Status: COMPLETED | OUTPATIENT
Start: 2024-03-31 | End: 2024-03-31

## 2024-03-31 RX ORDER — ONDANSETRON 2 MG/ML
4 INJECTION INTRAMUSCULAR; INTRAVENOUS EVERY 6 HOURS PRN
Status: DISCONTINUED | OUTPATIENT
Start: 2024-03-31 | End: 2024-04-05 | Stop reason: HOSPADM

## 2024-03-31 RX ORDER — ACETAMINOPHEN 500 MG
1000 TABLET ORAL ONCE
Status: DISCONTINUED | OUTPATIENT
Start: 2024-03-31 | End: 2024-04-01

## 2024-03-31 RX ORDER — PROPOFOL 10 MG/ML
INJECTION, EMULSION INTRAVENOUS PRN
Status: DISCONTINUED | OUTPATIENT
Start: 2024-03-31 | End: 2024-03-31 | Stop reason: SDUPTHER

## 2024-03-31 RX ORDER — LIDOCAINE HYDROCHLORIDE 20 MG/ML
INJECTION, SOLUTION EPIDURAL; INFILTRATION; INTRACAUDAL; PERINEURAL PRN
Status: DISCONTINUED | OUTPATIENT
Start: 2024-03-31 | End: 2024-03-31 | Stop reason: SDUPTHER

## 2024-03-31 RX ORDER — ACETAMINOPHEN 325 MG/1
650 TABLET ORAL EVERY 6 HOURS PRN
Status: DISCONTINUED | OUTPATIENT
Start: 2024-03-31 | End: 2024-04-05 | Stop reason: HOSPADM

## 2024-03-31 RX ORDER — FENTANYL CITRATE 50 UG/ML
50 INJECTION, SOLUTION INTRAMUSCULAR; INTRAVENOUS ONCE
Status: COMPLETED | OUTPATIENT
Start: 2024-03-31 | End: 2024-03-31

## 2024-03-31 RX ORDER — SODIUM CHLORIDE 9 MG/ML
INJECTION, SOLUTION INTRAVENOUS ONCE
Status: COMPLETED | OUTPATIENT
Start: 2024-03-31 | End: 2024-03-31

## 2024-03-31 RX ORDER — POLYETHYLENE GLYCOL 3350 17 G/17G
17 POWDER, FOR SOLUTION ORAL DAILY PRN
Status: DISCONTINUED | OUTPATIENT
Start: 2024-03-31 | End: 2024-04-05 | Stop reason: HOSPADM

## 2024-03-31 RX ORDER — SODIUM CHLORIDE, SODIUM LACTATE, POTASSIUM CHLORIDE, CALCIUM CHLORIDE 600; 310; 30; 20 MG/100ML; MG/100ML; MG/100ML; MG/100ML
INJECTION, SOLUTION INTRAVENOUS CONTINUOUS PRN
Status: DISCONTINUED | OUTPATIENT
Start: 2024-03-31 | End: 2024-03-31 | Stop reason: SDUPTHER

## 2024-03-31 RX ORDER — 0.9 % SODIUM CHLORIDE 0.9 %
500 INTRAVENOUS SOLUTION INTRAVENOUS ONCE
Status: COMPLETED | OUTPATIENT
Start: 2024-03-31 | End: 2024-03-31

## 2024-03-31 RX ADMIN — SODIUM CHLORIDE 1000 ML: 9 INJECTION, SOLUTION INTRAVENOUS at 20:17

## 2024-03-31 RX ADMIN — SODIUM CHLORIDE 500 ML: 9 INJECTION, SOLUTION INTRAVENOUS at 21:31

## 2024-03-31 RX ADMIN — IOPAMIDOL 75 ML: 755 INJECTION, SOLUTION INTRAVENOUS at 20:39

## 2024-03-31 RX ADMIN — IOPAMIDOL 18 ML: 755 INJECTION, SOLUTION INTRAVENOUS at 20:41

## 2024-03-31 RX ADMIN — PROPOFOL 50 MG: 10 INJECTION, EMULSION INTRAVENOUS at 23:02

## 2024-03-31 RX ADMIN — ONDANSETRON 4 MG: 2 INJECTION INTRAMUSCULAR; INTRAVENOUS at 18:44

## 2024-03-31 RX ADMIN — PHENYLEPHRINE HYDROCHLORIDE 100 MCG: 10 INJECTION INTRAVENOUS at 23:37

## 2024-03-31 RX ADMIN — NOREPINEPHRINE BITARTRATE 5 MCG/MIN: 1 SOLUTION INTRAVENOUS at 22:07

## 2024-03-31 RX ADMIN — CEFTRIAXONE SODIUM 2000 MG: 2 INJECTION, POWDER, FOR SOLUTION INTRAMUSCULAR; INTRAVENOUS at 20:20

## 2024-03-31 RX ADMIN — LIDOCAINE HYDROCHLORIDE 40 MG: 20 INJECTION, SOLUTION EPIDURAL; INFILTRATION; INTRACAUDAL; PERINEURAL at 23:02

## 2024-03-31 RX ADMIN — ACETAMINOPHEN 1000 MG: 500 TABLET ORAL at 18:50

## 2024-03-31 RX ADMIN — FENTANYL CITRATE 50 MCG: 50 INJECTION INTRAMUSCULAR; INTRAVENOUS at 18:44

## 2024-03-31 RX ADMIN — SODIUM CHLORIDE, POTASSIUM CHLORIDE, SODIUM LACTATE AND CALCIUM CHLORIDE: 600; 310; 30; 20 INJECTION, SOLUTION INTRAVENOUS at 22:50

## 2024-03-31 RX ADMIN — SODIUM CHLORIDE: 9 INJECTION, SOLUTION INTRAVENOUS at 22:06

## 2024-03-31 RX ADMIN — SODIUM CHLORIDE 1000 ML: 9 INJECTION, SOLUTION INTRAVENOUS at 18:51

## 2024-03-31 RX ADMIN — PROPOFOL 20 MG: 10 INJECTION, EMULSION INTRAVENOUS at 23:13

## 2024-03-31 RX ADMIN — PROPOFOL 120 MCG/KG/MIN: 10 INJECTION, EMULSION INTRAVENOUS at 23:03

## 2024-03-31 ASSESSMENT — PAIN DESCRIPTION - ORIENTATION
ORIENTATION: LEFT
ORIENTATION: LEFT

## 2024-03-31 ASSESSMENT — PAIN - FUNCTIONAL ASSESSMENT
PAIN_FUNCTIONAL_ASSESSMENT: PREVENTS OR INTERFERES SOME ACTIVE ACTIVITIES AND ADLS
PAIN_FUNCTIONAL_ASSESSMENT: 0-10
PAIN_FUNCTIONAL_ASSESSMENT: 0-10

## 2024-03-31 ASSESSMENT — PAIN DESCRIPTION - DESCRIPTORS
DESCRIPTORS: CRAMPING;DISCOMFORT
DESCRIPTORS: SHARP

## 2024-03-31 ASSESSMENT — PAIN DESCRIPTION - DIRECTION: RADIATING_TOWARDS: ABDOMEN

## 2024-03-31 ASSESSMENT — PAIN DESCRIPTION - FREQUENCY: FREQUENCY: CONTINUOUS

## 2024-03-31 ASSESSMENT — PAIN DESCRIPTION - ONSET: ONSET: SUDDEN

## 2024-03-31 ASSESSMENT — PAIN DESCRIPTION - PAIN TYPE: TYPE: ACUTE PAIN

## 2024-03-31 ASSESSMENT — PAIN SCALES - GENERAL
PAINLEVEL_OUTOF10: 4
PAINLEVEL_OUTOF10: 2

## 2024-03-31 ASSESSMENT — PAIN DESCRIPTION - LOCATION
LOCATION: FLANK
LOCATION: ABDOMEN;FLANK
LOCATION: PERINEUM

## 2024-03-31 NOTE — ED PROVIDER NOTES
[facility] 2W ICU  EMERGENCY DEPARTMENT ENCOUNTER        Pt Name: Mary Luu  MRN: [id number]  Birthdate 1950  Date of evaluation: 3/31/2024  Provider: Martín Boyer MD  PCP: Rob Lott DO  Note Started: 5:40 PM EDT 3/31/24    CHIEF COMPLAINT       Chief Complaint   Patient presents with    Abdominal Pain    Flank Pain     Left sided flank pain, LLQ pain; began this AM; fever; n/v/d; hx of pyleo and diverticulitis       HISTORY OF PRESENT ILLNESS: 1 or more Elements   History From: Patient    Limitations to history : None  Social Determinants : None    Mary Luu is a 73 y.o. female with a history of hypertension, diverticulosis, Marie-en-Y gastric bypass who presents with sudden onset left-sided flank pain that started since this morning.  It was associated with nausea and chills.    She mentions that more than pain, at this time she feels it more as a discomfort on the left flank region.  No urinary symptoms.    Denies any fever, vomiting, headache, dizziness, vision changes, neck tenderness or stiffness, weakness, chest pain, palpitations, leg swelling/tenderness, sob, cough, dysuria, hematuria, diarrhea, constipation, bloody stools.    Nursing Notes were all reviewed and agreed with or any disagreements were addressed in the HPI.    ROS:   Pertinent positives and negatives are stated within HPI, all other systems reviewed and are negative.      --------------------------------------------- PAST HISTORY ---------------------------------------------  Past Medical History:       Diagnosis Date    Headache     Hypertension        Past Surgical History:       Procedure Laterality Date    BREAST SURGERY      GASTRIC BYPASS SURGERY  2011    HYSTERECTOMY (CERVIX STATUS UNKNOWN)      Total    JOINT REPLACEMENT      RHINOPLASTY  1973    SHOULDER SURGERY Left 3/31/2024    CYST[facility]GRADE [facility]       Social History:  reports that she has never smoked. She  inadvertent computerized transcription errors may be present

## 2024-04-01 LAB
ALBUMIN SERPL-MCNC: 3.1 G/DL (ref 3.5–5.2)
ALBUMIN SERPL-MCNC: 3.2 G/DL (ref 3.5–5.2)
ALP SERPL-CCNC: 72 U/L (ref 35–104)
ALP SERPL-CCNC: 81 U/L (ref 35–104)
ALT SERPL-CCNC: 34 U/L (ref 0–32)
ALT SERPL-CCNC: 35 U/L (ref 0–32)
ANION GAP SERPL CALCULATED.3IONS-SCNC: 10 MMOL/L (ref 7–16)
ANION GAP SERPL CALCULATED.3IONS-SCNC: 11 MMOL/L (ref 7–16)
AST SERPL-CCNC: 52 U/L (ref 0–31)
AST SERPL-CCNC: 61 U/L (ref 0–31)
BASOPHILS # BLD: 0 K/UL (ref 0–0.2)
BASOPHILS # BLD: 0 K/UL (ref 0–0.2)
BASOPHILS NFR BLD: 0 % (ref 0–2)
BASOPHILS NFR BLD: 0 % (ref 0–2)
BILIRUB SERPL-MCNC: 0.3 MG/DL (ref 0–1.2)
BILIRUB SERPL-MCNC: 0.3 MG/DL (ref 0–1.2)
BUN SERPL-MCNC: 26 MG/DL (ref 6–23)
BUN SERPL-MCNC: 27 MG/DL (ref 6–23)
CALCIUM SERPL-MCNC: 8.1 MG/DL (ref 8.6–10.2)
CALCIUM SERPL-MCNC: 8.1 MG/DL (ref 8.6–10.2)
CHLORIDE SERPL-SCNC: 104 MMOL/L (ref 98–107)
CHLORIDE SERPL-SCNC: 105 MMOL/L (ref 98–107)
CHOLEST SERPL-MCNC: 112 MG/DL
CO2 SERPL-SCNC: 19 MMOL/L (ref 22–29)
CO2 SERPL-SCNC: 21 MMOL/L (ref 22–29)
CORTIS SERPL-MCNC: 34.2 UG/DL (ref 2.7–18.4)
CREAT SERPL-MCNC: 1.2 MG/DL (ref 0.5–1)
CREAT SERPL-MCNC: 1.2 MG/DL (ref 0.5–1)
CRP SERPL HS-MCNC: 3 MG/L (ref 0–5)
EKG ATRIAL RATE: 90 BPM
EKG P AXIS: 28 DEGREES
EKG P-R INTERVAL: 160 MS
EKG Q-T INTERVAL: 288 MS
EKG QRS DURATION: 86 MS
EKG QTC CALCULATION (BAZETT): 352 MS
EKG R AXIS: -16 DEGREES
EKG T AXIS: -14 DEGREES
EKG VENTRICULAR RATE: 90 BPM
EOSINOPHIL # BLD: 0 K/UL (ref 0.05–0.5)
EOSINOPHIL # BLD: 0 K/UL (ref 0.05–0.5)
EOSINOPHILS RELATIVE PERCENT: 0 % (ref 0–6)
EOSINOPHILS RELATIVE PERCENT: 0 % (ref 0–6)
ERYTHROCYTE [DISTWIDTH] IN BLOOD BY AUTOMATED COUNT: 13.2 % (ref 11.5–15)
ERYTHROCYTE [DISTWIDTH] IN BLOOD BY AUTOMATED COUNT: 13.3 % (ref 11.5–15)
GFR SERPL CREATININE-BSD FRML MDRD: 46 ML/MIN/1.73M2
GFR SERPL CREATININE-BSD FRML MDRD: 49 ML/MIN/1.73M2
GLUCOSE SERPL-MCNC: 121 MG/DL (ref 74–99)
GLUCOSE SERPL-MCNC: 123 MG/DL (ref 74–99)
HCT VFR BLD AUTO: 37.2 % (ref 34–48)
HCT VFR BLD AUTO: 37.6 % (ref 34–48)
HDLC SERPL-MCNC: 51 MG/DL
HGB BLD-MCNC: 12.1 G/DL (ref 11.5–15.5)
HGB BLD-MCNC: 12.2 G/DL (ref 11.5–15.5)
LACTATE BLDV-SCNC: 2.2 MMOL/L (ref 0.5–2.2)
LACTATE BLDV-SCNC: 2.4 MMOL/L (ref 0.5–2.2)
LACTATE BLDV-SCNC: 4.7 MMOL/L (ref 0.5–2.2)
LDLC SERPL CALC-MCNC: 45 MG/DL
LYMPHOCYTES NFR BLD: 0 K/UL (ref 1.5–4)
LYMPHOCYTES NFR BLD: 0.28 K/UL (ref 1.5–4)
LYMPHOCYTES RELATIVE PERCENT: 0 % (ref 20–42)
LYMPHOCYTES RELATIVE PERCENT: 1 % (ref 20–42)
MAGNESIUM SERPL-MCNC: 1.5 MG/DL (ref 1.6–2.6)
MAGNESIUM SERPL-MCNC: 1.5 MG/DL (ref 1.6–2.6)
MAGNESIUM SERPL-MCNC: 2.2 MG/DL (ref 1.6–2.6)
MCH RBC QN AUTO: 30 PG (ref 26–35)
MCH RBC QN AUTO: 30.2 PG (ref 26–35)
MCHC RBC AUTO-ENTMCNC: 32.4 G/DL (ref 32–34.5)
MCHC RBC AUTO-ENTMCNC: 32.5 G/DL (ref 32–34.5)
MCV RBC AUTO: 92.4 FL (ref 80–99.9)
MCV RBC AUTO: 92.8 FL (ref 80–99.9)
METAMYELOCYTES ABSOLUTE COUNT: 0.83 K/UL (ref 0–0.12)
METAMYELOCYTES ABSOLUTE COUNT: 2.2 K/UL (ref 0–0.12)
METAMYELOCYTES: 4 % (ref 0–1)
METAMYELOCYTES: 7 % (ref 0–1)
MONOCYTES NFR BLD: 0.42 K/UL (ref 0.1–0.95)
MONOCYTES NFR BLD: 0.83 K/UL (ref 0.1–0.95)
MONOCYTES NFR BLD: 2 % (ref 2–12)
MONOCYTES NFR BLD: 3 % (ref 2–12)
MYELOCYTES ABSOLUTE COUNT: 0.83 K/UL
MYELOCYTES: 3 %
NEUTROPHILS NFR BLD: 87 % (ref 43–80)
NEUTROPHILS NFR BLD: 95 % (ref 43–80)
NEUTS SEG NFR BLD: 22.65 K/UL (ref 1.8–7.3)
NEUTS SEG NFR BLD: 27.27 K/UL (ref 1.8–7.3)
PHOSPHATE SERPL-MCNC: 2.5 MG/DL (ref 2.5–4.5)
PHOSPHATE SERPL-MCNC: 3.3 MG/DL (ref 2.5–4.5)
PLATELET # BLD AUTO: 157 K/UL (ref 130–450)
PLATELET # BLD AUTO: 190 K/UL (ref 130–450)
PMV BLD AUTO: 10.5 FL (ref 7–12)
PMV BLD AUTO: 11 FL (ref 7–12)
POTASSIUM SERPL-SCNC: 3.9 MMOL/L (ref 3.5–5)
POTASSIUM SERPL-SCNC: 3.9 MMOL/L (ref 3.5–5)
PROCALCITONIN SERPL-MCNC: 11.64 NG/ML (ref 0–0.08)
PROT SERPL-MCNC: 5 G/DL (ref 6.4–8.3)
PROT SERPL-MCNC: 5.3 G/DL (ref 6.4–8.3)
RBC # BLD AUTO: 4.01 M/UL (ref 3.5–5.5)
RBC # BLD AUTO: 4.07 M/UL (ref 3.5–5.5)
RBC # BLD: ABNORMAL 10*6/UL
SODIUM SERPL-SCNC: 135 MMOL/L (ref 132–146)
SODIUM SERPL-SCNC: 135 MMOL/L (ref 132–146)
TRIGL SERPL-MCNC: 80 MG/DL
TROPONIN I SERPL HS-MCNC: 26 NG/L (ref 0–9)
VLDLC SERPL CALC-MCNC: 16 MG/DL
WBC OTHER # BLD: 23.9 K/UL (ref 4.5–11.5)
WBC OTHER # BLD: 31.4 K/UL (ref 4.5–11.5)

## 2024-04-01 PROCEDURE — P9047 ALBUMIN (HUMAN), 25%, 50ML: HCPCS

## 2024-04-01 PROCEDURE — 84484 ASSAY OF TROPONIN QUANT: CPT

## 2024-04-01 PROCEDURE — 2580000003 HC RX 258

## 2024-04-01 PROCEDURE — 80061 LIPID PANEL: CPT

## 2024-04-01 PROCEDURE — 6360000002 HC RX W HCPCS: Performed by: STUDENT IN AN ORGANIZED HEALTH CARE EDUCATION/TRAINING PROGRAM

## 2024-04-01 PROCEDURE — 0T778DZ DILATION OF LEFT URETER WITH INTRALUMINAL DEVICE, VIA NATURAL OR ARTIFICIAL OPENING ENDOSCOPIC: ICD-10-PCS | Performed by: UROLOGY

## 2024-04-01 PROCEDURE — 2580000003 HC RX 258: Performed by: INTERNAL MEDICINE

## 2024-04-01 PROCEDURE — 87081 CULTURE SCREEN ONLY: CPT

## 2024-04-01 PROCEDURE — 6370000000 HC RX 637 (ALT 250 FOR IP)

## 2024-04-01 PROCEDURE — 2580000003 HC RX 258: Performed by: UROLOGY

## 2024-04-01 PROCEDURE — 2000000000 HC ICU R&B

## 2024-04-01 PROCEDURE — 83735 ASSAY OF MAGNESIUM: CPT

## 2024-04-01 PROCEDURE — 6360000002 HC RX W HCPCS

## 2024-04-01 PROCEDURE — 84100 ASSAY OF PHOSPHORUS: CPT

## 2024-04-01 PROCEDURE — 6370000000 HC RX 637 (ALT 250 FOR IP): Performed by: INTERNAL MEDICINE

## 2024-04-01 PROCEDURE — BT1F1ZZ FLUOROSCOPY OF LEFT KIDNEY, URETER AND BLADDER USING LOW OSMOLAR CONTRAST: ICD-10-PCS | Performed by: UROLOGY

## 2024-04-01 PROCEDURE — 85025 COMPLETE CBC W/AUTO DIFF WBC: CPT

## 2024-04-01 PROCEDURE — 2580000003 HC RX 258: Performed by: STUDENT IN AN ORGANIZED HEALTH CARE EDUCATION/TRAINING PROGRAM

## 2024-04-01 PROCEDURE — 93010 ELECTROCARDIOGRAM REPORT: CPT | Performed by: INTERNAL MEDICINE

## 2024-04-01 PROCEDURE — 36592 COLLECT BLOOD FROM PICC: CPT

## 2024-04-01 PROCEDURE — 7100000000 HC PACU RECOVERY - FIRST 15 MIN

## 2024-04-01 PROCEDURE — 83605 ASSAY OF LACTIC ACID: CPT

## 2024-04-01 PROCEDURE — 80053 COMPREHEN METABOLIC PANEL: CPT

## 2024-04-01 PROCEDURE — 7100000001 HC PACU RECOVERY - ADDTL 15 MIN

## 2024-04-01 RX ORDER — MIDODRINE HYDROCHLORIDE 5 MG/1
10 TABLET ORAL
Status: DISCONTINUED | OUTPATIENT
Start: 2024-04-01 | End: 2024-04-02

## 2024-04-01 RX ORDER — ALBUMIN (HUMAN) 12.5 G/50ML
25 SOLUTION INTRAVENOUS EVERY 8 HOURS
Status: DISCONTINUED | OUTPATIENT
Start: 2024-04-01 | End: 2024-04-02

## 2024-04-01 RX ORDER — ACETAMINOPHEN 500 MG
1000 TABLET ORAL ONCE
Status: COMPLETED | OUTPATIENT
Start: 2024-04-01 | End: 2024-04-01

## 2024-04-01 RX ORDER — MAGNESIUM SULFATE IN WATER 40 MG/ML
2000 INJECTION, SOLUTION INTRAVENOUS ONCE
Status: COMPLETED | OUTPATIENT
Start: 2024-04-01 | End: 2024-04-01

## 2024-04-01 RX ORDER — TROSPIUM CHLORIDE 20 MG/1
20 TABLET, FILM COATED ORAL ONCE
Status: COMPLETED | OUTPATIENT
Start: 2024-04-01 | End: 2024-04-01

## 2024-04-01 RX ORDER — FAMOTIDINE 20 MG/1
20 TABLET, FILM COATED ORAL DAILY
Status: DISCONTINUED | OUTPATIENT
Start: 2024-04-01 | End: 2024-04-05 | Stop reason: HOSPADM

## 2024-04-01 RX ORDER — LANOLIN ALCOHOL/MO/W.PET/CERES
400 CREAM (GRAM) TOPICAL
Status: DISCONTINUED | OUTPATIENT
Start: 2024-04-01 | End: 2024-04-05 | Stop reason: HOSPADM

## 2024-04-01 RX ORDER — PANTOPRAZOLE SODIUM 40 MG/1
40 TABLET, DELAYED RELEASE ORAL
Status: DISCONTINUED | OUTPATIENT
Start: 2024-04-01 | End: 2024-04-01

## 2024-04-01 RX ORDER — SODIUM CHLORIDE 9 MG/ML
INJECTION, SOLUTION INTRAVENOUS PRN
Status: DISCONTINUED | OUTPATIENT
Start: 2024-04-01 | End: 2024-04-01

## 2024-04-01 RX ORDER — SODIUM CHLORIDE 0.9 % (FLUSH) 0.9 %
5-40 SYRINGE (ML) INJECTION EVERY 12 HOURS SCHEDULED
Status: DISCONTINUED | OUTPATIENT
Start: 2024-04-01 | End: 2024-04-01

## 2024-04-01 RX ORDER — 0.9 % SODIUM CHLORIDE 0.9 %
500 INTRAVENOUS SOLUTION INTRAVENOUS ONCE
Status: COMPLETED | OUTPATIENT
Start: 2024-04-01 | End: 2024-04-01

## 2024-04-01 RX ORDER — SODIUM CHLORIDE 0.9 % (FLUSH) 0.9 %
5-40 SYRINGE (ML) INJECTION PRN
Status: DISCONTINUED | OUTPATIENT
Start: 2024-04-01 | End: 2024-04-05 | Stop reason: HOSPADM

## 2024-04-01 RX ADMIN — ALBUMIN (HUMAN) 25 G: 0.25 INJECTION, SOLUTION INTRAVENOUS at 18:28

## 2024-04-01 RX ADMIN — SODIUM CHLORIDE: 9 INJECTION, SOLUTION INTRAVENOUS at 00:32

## 2024-04-01 RX ADMIN — ACETAMINOPHEN 1000 MG: 500 TABLET ORAL at 08:32

## 2024-04-01 RX ADMIN — ENOXAPARIN SODIUM 40 MG: 100 INJECTION SUBCUTANEOUS at 08:32

## 2024-04-01 RX ADMIN — HYDROCORTISONE SODIUM SUCCINATE 100 MG: 100 INJECTION, POWDER, FOR SOLUTION INTRAMUSCULAR; INTRAVENOUS at 20:57

## 2024-04-01 RX ADMIN — ACETAMINOPHEN 650 MG: 325 TABLET ORAL at 02:28

## 2024-04-01 RX ADMIN — TROSPIUM CHLORIDE 20 MG: 20 TABLET, FILM COATED ORAL at 00:31

## 2024-04-01 RX ADMIN — MIDODRINE HYDROCHLORIDE 10 MG: 5 TABLET ORAL at 16:42

## 2024-04-01 RX ADMIN — HYDROCORTISONE SODIUM SUCCINATE 100 MG: 100 INJECTION, POWDER, FOR SOLUTION INTRAMUSCULAR; INTRAVENOUS at 06:07

## 2024-04-01 RX ADMIN — WATER 2000 MG: 1 INJECTION INTRAMUSCULAR; INTRAVENOUS; SUBCUTANEOUS at 20:57

## 2024-04-01 RX ADMIN — HYDROCORTISONE SODIUM SUCCINATE 100 MG: 100 INJECTION, POWDER, FOR SOLUTION INTRAMUSCULAR; INTRAVENOUS at 13:33

## 2024-04-01 RX ADMIN — ACETAMINOPHEN 650 MG: 325 TABLET ORAL at 21:16

## 2024-04-01 RX ADMIN — SODIUM CHLORIDE, PRESERVATIVE FREE 10 ML: 5 INJECTION INTRAVENOUS at 08:32

## 2024-04-01 RX ADMIN — SODIUM CHLORIDE, PRESERVATIVE FREE 10 ML: 5 INJECTION INTRAVENOUS at 20:57

## 2024-04-01 RX ADMIN — MIDODRINE HYDROCHLORIDE 10 MG: 5 TABLET ORAL at 11:20

## 2024-04-01 RX ADMIN — MAGNESIUM OXIDE 400 MG (241.3 MG MAGNESIUM) TABLET 400 MG: TABLET at 08:32

## 2024-04-01 RX ADMIN — MAGNESIUM SULFATE HEPTAHYDRATE 2000 MG: 40 INJECTION, SOLUTION INTRAVENOUS at 06:13

## 2024-04-01 RX ADMIN — SODIUM CHLORIDE: 9 INJECTION, SOLUTION INTRAVENOUS at 20:57

## 2024-04-01 RX ADMIN — FAMOTIDINE 20 MG: 20 TABLET ORAL at 13:33

## 2024-04-01 RX ADMIN — SODIUM CHLORIDE 500 ML: 9 INJECTION, SOLUTION INTRAVENOUS at 15:32

## 2024-04-01 RX ADMIN — ALBUMIN (HUMAN) 25 G: 0.25 INJECTION, SOLUTION INTRAVENOUS at 11:26

## 2024-04-01 ASSESSMENT — PAIN DESCRIPTION - ORIENTATION
ORIENTATION: LEFT
ORIENTATION: LEFT
ORIENTATION: RIGHT;LEFT;MID
ORIENTATION: LEFT

## 2024-04-01 ASSESSMENT — PAIN SCALES - GENERAL
PAINLEVEL_OUTOF10: 5
PAINLEVEL_OUTOF10: 0
PAINLEVEL_OUTOF10: 1
PAINLEVEL_OUTOF10: 2
PAINLEVEL_OUTOF10: 4
PAINLEVEL_OUTOF10: 5
PAINLEVEL_OUTOF10: 1
PAINLEVEL_OUTOF10: 5
PAINLEVEL_OUTOF10: 3
PAINLEVEL_OUTOF10: 2
PAINLEVEL_OUTOF10: 1
PAINLEVEL_OUTOF10: 3

## 2024-04-01 ASSESSMENT — PAIN DESCRIPTION - ONSET
ONSET: ON-GOING
ONSET: ON-GOING

## 2024-04-01 ASSESSMENT — PAIN - FUNCTIONAL ASSESSMENT
PAIN_FUNCTIONAL_ASSESSMENT: ACTIVITIES ARE NOT PREVENTED
PAIN_FUNCTIONAL_ASSESSMENT: PREVENTS OR INTERFERES SOME ACTIVE ACTIVITIES AND ADLS
PAIN_FUNCTIONAL_ASSESSMENT: ACTIVITIES ARE NOT PREVENTED
PAIN_FUNCTIONAL_ASSESSMENT: ACTIVITIES ARE NOT PREVENTED

## 2024-04-01 ASSESSMENT — PAIN DESCRIPTION - FREQUENCY
FREQUENCY: CONTINUOUS

## 2024-04-01 ASSESSMENT — PAIN DESCRIPTION - PAIN TYPE
TYPE: ACUTE PAIN

## 2024-04-01 ASSESSMENT — PAIN DESCRIPTION - DESCRIPTORS
DESCRIPTORS: BURNING
DESCRIPTORS: BURNING
DESCRIPTORS: ACHING
DESCRIPTORS: BURNING
DESCRIPTORS: BURNING
DESCRIPTORS: ACHING
DESCRIPTORS: ACHING;SORE;STABBING
DESCRIPTORS: BURNING
DESCRIPTORS: ACHING

## 2024-04-01 ASSESSMENT — PAIN DESCRIPTION - LOCATION
LOCATION: PERINEUM
LOCATION: HEAD
LOCATION: HEAD
LOCATION: PERINEUM
LOCATION: HEAD
LOCATION: PERINEUM
LOCATION: HEAD
LOCATION: PERINEUM
LOCATION: PERINEUM
LOCATION: HEAD
LOCATION: HEAD

## 2024-04-01 NOTE — PROGRESS NOTES
4 Eyes Skin Assessment     NAME:  Mary Luu  YOB: 1950  MEDICAL RECORD NUMBER:  [id number]    The patient is being assessed for  Admission    I agree that at least one RN has performed a thorough Head to Toe Skin Assessment on the patient. ALL assessment sites listed below have been assessed.      Areas assessed by both nurses:    Arms, Elbows, Hands, Sacrum. Buttock, Coccyx, Ischium, and Legs. Feet and Heels        Does the Patient have a Wound? No noted wound(s)       Tera Prevention initiated by RN: Yes, SOS  Wound Care Orders initiated by RN: No    Pressure Injury (Stage 3,4, Unstageable, DTI, NWPT, and Complex wounds) if present, place Wound referral order by RN under : No    New Ostomies, if present place, Ostomy referral order under : No     Nurse 1 eSignature: Electronically signed by Dorina Russell RN on 4/1/24 at 1:20 AM EDT    **SHARE this note so that the co-signing nurse can place an eSignature**    Nurse 2 eSignature: Electronically signed by Melyssa Sinha RN on 4/1/24 at 1:29 AM EDT

## 2024-04-01 NOTE — PROGRESS NOTES
SPIRITUAL HEALTH SERVICES - [facility]  PROGRESS NOTE    Name: Mary Luu                Temple: Spiritual   Anointed (Last Rites): NA    Referral: Routine Visit    Assessment:  Upon entering the room  observes Patient sleeping in bed, on back. Patient woke when  was leaving prayer card.      Intervention:   introduced self to Patient. Patient stated, \"I am more spiritual than Caodaism.\"  explained if Patient wanted  to return at a later time, to let her nurse know and Chaplains would be notified.    Outcome:  Patient accepting of  presence.    Plan:  Chaplains will remain available to offer spiritual and emotional support as needed.      Electronically signed by SUMMER Gustafson, on 4/1/2024 at 3:49 PM.  Spiritual Care Department  [facility]  [phone]

## 2024-04-01 NOTE — CONSULTS
3/31/2024 10:34 PM  Service: Urology  Group: [facility] (Edis/Sanaz/Amilcar)    Mary Luu  [id number]     Chief Complaint: 9 mm proximal ureteral stone     History of Present Illness:  The patient is a 73 y.o. female patient who presents with the above  She has not seen a urologist  She has some acute flank  She had a CT done that was found to have 9 mm proximal ureteral stone  She then developed some hypotension  She was placed on Levophed  She has not have a stone in the past  The risk of the surgery was discussed at length.  The risk of the anesthesia and loss of airway.  Cardiovascular risks, myocardial infraction, cerebral vascular accident, pulmonary embolism, deep vein thrombosis.  Injury risk, bowel injury, bladder injury, ureteral injury, blood vessel injury, delayed presentation of the injury (ie scar tissue or stricture formation).  These can lead to bleeding, requiring transfusion.  He risk of infection with can lead to sepsis.  The risk of death.  The may be other issues that present that are not listed above which can occur.  The patient understood these risks, they understood the benefits, they understood the alternatives and fully consent to proceed.  The needs for a second procedure may also be required.  We discussed placing to sleep  Looking in the bladder shooting x-rays if needed  Seeing the stone  Getting to the stone  Placement of laser on the stone  Sometimes we use a stone basket  90% of the time being able to get the stone managed  10% we see the stone but cannot get to the stone  We place a stent in those situations and represent for another procedure in 2-3 weeks  If we see infected urine, we place a stent an come back for a second procedure once treated  1/1000 the stone is so impacted we have to abort the surgery and place a PNT  Then additional procedures will need to be preformed  Past Medical History:   Diagnosis Date    Headache     Hypertension        Past Surgical

## 2024-04-01 NOTE — ANESTHESIA POSTPROCEDURE EVALUATION
Department of Anesthesiology  Postprocedure Note    Patient: Mary Luu  MRN: [id number]  YOB: 1950  Date of evaluation: 3/31/2024    Procedure Summary       Date: 03/31/24 Room / Location: [address]    Anesthesia Start: 2250 Anesthesia Stop:     Procedure: CYSTOSCOPY RETROGRADE left stent (Left) Diagnosis:       Hydronephrosis with urinary obstruction due to renal calculus      (Hydronephrosis with urinary obstruction due to renal calculus [N13.2])    Surgeons: Diego Randhawa DO Responsible Provider: Alex Headley DO    Anesthesia Type: MAC, general ASA Status: 4 - Emergent            Anesthesia Type: No value filed.    Celina Phase I:      Celina Phase II:      Anesthesia Post Evaluation    Patient location during evaluation: ICU  Patient participation: complete - patient participated  Level of consciousness: awake  Airway patency: patent  Nausea & Vomiting: no nausea and no vomiting  Cardiovascular status: hemodynamically stable  Respiratory status: acceptable  Hydration status: euvolemic  Pain management: adequate        No notable events documented.

## 2024-04-01 NOTE — ANESTHESIA POSTPROCEDURE EVALUATION
Department of Anesthesiology  Postprocedure Note    Patient: Mary Luu  MRN: [id number]  YOB: 1950  Date of evaluation: 3/31/2024    Procedure Summary       Date: 03/31/24 Room / Location: [address]    Anesthesia Start:  Anesthesia Stop:     Procedure: CYSTOSCOPY RETROGRADE left stent (Left) Diagnosis:       Hydronephrosis with urinary obstruction due to renal calculus      (Hydronephrosis with urinary obstruction due to renal calculus [N13.2])    Surgeons: Diego Randhawa DO Responsible Provider:     Anesthesia Type: Not recorded ASA Status: Not recorded            Anesthesia Type: No value filed.    Celina Phase I:      Celina Phase II:      Anesthesia Post Evaluation    Patient location during evaluation: ICU  Patient participation: complete - patient participated  Level of consciousness: awake  Airway patency: patent  Nausea & Vomiting: no nausea and no vomiting  Cardiovascular status: hypotensive (BP supported with levophed.  Pt. is septic.)  Respiratory status: acceptable  Hydration status: stable  Comments: Dr. Randhawa spoke with Intensivist, Dr. Fontaine.  Pain management: adequate    No notable events documented.

## 2024-04-01 NOTE — ACP (ADVANCE CARE PLANNING)
Advance Care Planning   Healthcare Decision Maker:    Primary Decision Maker: BETH MANCERA - Brother/Sister - [phone]    Click here to complete Healthcare Decision Makers including selection of the Healthcare Decision Maker Relationship (ie \"Primary\").

## 2024-04-01 NOTE — ANESTHESIA PRE PROCEDURE
Department of Anesthesiology  Preprocedure Note       Name:  Mary Luu   Age:  73 y.o.  :  1950                                          MRN:  [id number]         Date:  3/31/2024      Surgeon: Surgeon(s):  Diego Randhawa DO    Procedure: Procedure(s):  CYSTOSCOPY RETROGRADE left stent    Medications prior to admission:   Prior to Admission medications    Medication Sig Start Date End Date Taking? Authorizing Provider   ferrous fumarate-vitamin c (YARELI-SEQUELS) 65-25 MG TBCR CR tablet Take 1 tablet by mouth daily (with breakfast) Takes 29 mg daily    Sean Dee MD   Cholecalciferol (VITAMIN D3) 1.25 MG (84032 UT) CAPS Take 1 capsule by mouth Daily    Sean Dee MD   hydroCHLOROthiazide (MICROZIDE) 12.5 MG capsule Take 1 capsule by mouth daily    Sean Dee MD   calcium carbonate (OSCAL) 500 MG TABS tablet Take 1 tablet by mouth 2 times daily    Sean Dee MD   Multiple Vitamins-Minerals (THERAPEUTIC MULTIVITAMIN-MINERALS) tablet Take 1 tablet by mouth 2 times daily    Sean Dee MD   Ascorbic Acid (VITAMIN C) 250 MG tablet Take 4 tablets by mouth daily    Sean Dee MD   vitamin B-12 (CYANOCOBALAMIN) 100 MCG tablet Take 10 tablets by mouth three times a week Monday, Wednesday, Friday    Sean Dee MD   magnesium oxide (MAG-OX) 400 MG tablet Take 250 mg by mouth daily    Sean Dee MD       Current medications:    Current Facility-Administered Medications   Medication Dose Route Frequency Provider Last Rate Last Admin    norepinephrine (LEVOPHED) 16 mg in sodium chloride 0.9 % 250 mL infusion  1-100 mcg/min IntraVENous Continuous Gerardo Vasquez DO 6.6 mL/hr at 24 7 mcg/min at 24    acetaminophen (TYLENOL) tablet 1,000 mg  1,000 mg Oral Once Gerardo Vasquez DO         Current Outpatient Medications   Medication Sig Dispense Refill    ferrous fumarate-vitamin c (YARELI-SEQUELS) 65-25 MG TBCR CR

## 2024-04-01 NOTE — CONSULTS
Critical Care Admit/Consult Note         Patient - Mary Luu   MRN -  [id number]   [id number]   - 1950      Date of Admission -  3/31/2024  5:39 PM  Date of evaluation -  3/31/2024  22/22   Hospital Day - 0            ADMIT/CONSULT DETAILS     Reason for Admit/Consult   Septic Shock     Consulting Service/Physician   Consulting - Rob Lott,   Primary Care Physician - Rob Lott,          HPI   The patient is a 73 y.o. female with significant past medical history of headache and hypertension.  Patient presented to ED for left sided flank pain, LLQ pain, nausea and chills.     ED course: Initial vital signs temp 100.4, RR 20, HR 88, /66, and SpO2 98% on RA. Labs significant for  BUN 27, lactic acid 3.2, troponin 12-->24, alk phos 124, AST 32, WBC 2.8, UA positive nitrates, moderate leuk esterase and white count 21-50. CTAP revealed 8 mm left ureteropelvic junction calculus causing obstruction with moderate hydronephrosis. Possible wall thickening versus understanding of areas of cecum and ascending colon.     Patient became hypotensive despite 2.5 L of fluid. A central line was placed and she was started on norepinephrine. Urology was consulted and plan for stat cystoscopy with left stent insertion. In Ed patient also received 1000 mg of tylenol, 50 mcg of fentanyl, 4 mg of Zofran, and 2 G of Ceftriaxone. Patient will be transferred to ICU postoperatively.          Past Medical History         Diagnosis Date    Headache     Hypertension         Past Surgical History           Procedure Laterality Date    BREAST SURGERY      GASTRIC BYPASS SURGERY      HYSTERECTOMY (CERVIX STATUS UNKNOWN)      Total    JOINT REPLACEMENT      RHINOPLASTY  1973       Current Medications   Current Medications    acetaminophen  1,000 mg Oral Once       IV Drips/Infusions   norepinephrine 7 mcg/min (24)     Home Medications  Not in a hospital admission.    Diet/Nutrition   No diet

## 2024-04-01 NOTE — PROGRESS NOTES
Critical Care Team - Daily Progress Note      Date and time: 4/1/2024 11:42 AM  Patient's name:  Mary Luu  Medical Record Number: [id number]  Patient's account/billing number: [id number]  Patient's YOB: 1950  Age: 73 y.o.  Date of Admission: 3/31/2024  5:39 PM  Length of stay during current admission: 1      Primary Care Physician: Rob Lott DO  ICU Attending Physician: Dr. Hilton    Code Status: Full Code    Reason for ICU admission: Septic shock    SUBJECTIVE:     OVERNIGHT EVENTS: Patient seen and examined at bedside in no acute distress.  Patient moved to ICU post OR last night with a left ureteral stent was placed by urology.  Patient is alert and oriented at this time.  Coburn catheter in place with approximately 500 cc of urine output overnight post OR.  Patient placed on 2 L nasal cannula for comfort but is not hypoxic on room air.  Patient does complain of a slight headache requesting Tylenol at this time.  Due to labile blood pressures patient remains on Levophed at this time.  There were no acute events upon arrival to the ICU and patient denies any complaints at this time.      Intake/Output:   I/O this shift:  In: 40 [P.O.:40]  Out: -   I/O last 3 completed shifts:  In: 1643.9 [I.V.:1631; IV Piggyback:13]  Out: 500 [Urine:500]    Awake and following commands: Yes  Current Ventilation: - No ventilator support  Secretions: Minimal  Sedation: none  Paralyzed: No  Vasopressors: norepinephrine    ROS:  Unless stated above, ROS is otherwise negative.    Initial HPI + past overnight events:   The patient is a 73 y.o. female with significant past medical history of headache and hypertension.  Patient presented to ED for left sided flank pain, LLQ pain, nausea and chills.      ED course: Initial vital signs temp 100.4, RR 20, HR 88, /66, and SpO2 98% on RA. Labs significant for  BUN 27, lactic acid 3.2, troponin 12-->24, alk phos 124, AST 32, WBC 2.8, UA positive nitrates,

## 2024-04-01 NOTE — CONSULTS
Oral Daily with breakfast    pantoprazole  40 mg Oral QAM AC    albumin human 25%  25 g IntraVENous q8h    midodrine  10 mg Oral TID WC    sodium chloride flush  5-40 mL IntraVENous 2 times per day    enoxaparin  40 mg SubCUTAneous Daily    cefTRIAXone (ROCEPHIN) IV  1,000 mg IntraVENous Q24H     Continuous Infusions:   norepinephrine 15 mcg/min (04/01/24 0839)    sodium chloride 100 mL/hr at 04/01/24 0645    sodium chloride       PRN Meds:sodium chloride flush, sodium chloride flush, sodium chloride, ondansetron **OR** ondansetron, polyethylene glycol, acetaminophen **OR** acetaminophen    Allergies:  Patient has no known allergies.    Social History:   Social History     Socioeconomic History    Marital status:      Spouse name: None    Number of children: None    Years of education: None    Highest education level: None   Tobacco Use    Smoking status: Never    Smokeless tobacco: Never   Vaping Use    Vaping Use: Never used   Substance and Sexual Activity    Alcohol use: Never    Drug use: Never     Social Determinants of Health     Food Insecurity: No Food Insecurity (3/31/2024)    Hunger Vital Sign     Worried About Running Out of Food in the Last Year: Never true     Ran Out of Food in the Last Year: Never true   Transportation Needs: No Transportation Needs (3/31/2024)    PRAPARE - Transportation     Lack of Transportation (Medical): No     Lack of Transportation (Non-Medical): No   Housing Stability: Low Risk  (3/31/2024)    Housing Stability Vital Sign     Unable to Pay for Housing in the Last Year: No     Number of Places Lived in the Last Year: 1     Unstable Housing in the Last Year: No     Family History:   History reviewed. No pertinent family history.. Otherwise non-pertinent to the chief complaint.    REVIEW OF SYSTEMS:    As mentioned in HPI, all other systems negative.       PHYSICAL EXAM:    Vitals:    BP (!) 100/53   Pulse 65   Temp 98.1 °F (36.7 °C) (Oral)   Resp 25   Ht 1.549 m (5'  0.98\")   Wt 88.5 kg (195 lb 1.7 oz)   SpO2 93%   BMI 36.88 kg/m²   Constitutional: The patient is awake, alert, and oriented.   Skin: Warm and dry. No rashes were noted. No jaundice.  HEENT: Eyes show round, and reactive pupils. Moist mucous membranes, no ulcerations, no thrush.   Neck: Supple to movements. No lymphadenopathy.   Chest: No use of accessory muscles to breathe. Symmetrical expansion. Auscultation reveals no wheezing, crackles, or rhonchi.   Cardiovascular: S1 and S2 are rhythmic and regular. No murmurs appreciated.   Abdomen: soft, left sided flank tenderness  Extremities: No clubbing, no cyanosis, no edema.  Musculoskeletal: no gross focal abnormalities  Neurological: awake alert oriented x 3  Lines: peripheral, right fem CVC      CBC+dif:  Recent Labs     03/31/24  1839 04/01/24  0034 04/01/24  0420   WBC 2.8* 23.9* 31.4*   HGB 15.1 12.2 12.1   HCT 46.8 37.6 37.2   MCV 89.3 92.4 92.8    190 157   NEUTROABS 2.36 22.65* 27.27*     Lab Results   Component Value Date    CRP 3.0 03/31/2024     No results found for: \"CRPHS\"  Lab Results   Component Value Date    SEDRATE 27 (H) 10/11/2023     Lab Results   Component Value Date    ALT 34 (H) 04/01/2024    AST 52 (H) 04/01/2024    ALKPHOS 72 04/01/2024    BILITOT 0.3 04/01/2024     Lab Results   Component Value Date/Time     04/01/2024 04:20 AM    K 3.9 04/01/2024 04:20 AM     04/01/2024 04:20 AM    CO2 19 04/01/2024 04:20 AM    BUN 27 04/01/2024 04:20 AM    CREATININE 1.2 04/01/2024 04:20 AM    GFRAA >60 03/19/2021 02:55 PM    LABGLOM 46 04/01/2024 04:20 AM    GLUCOSE 121 04/01/2024 04:20 AM    PROT 5.3 04/01/2024 04:20 AM    LABALBU 3.1 04/01/2024 04:20 AM    CALCIUM 8.1 04/01/2024 04:20 AM    BILITOT 0.3 04/01/2024 04:20 AM    ALKPHOS 72 04/01/2024 04:20 AM    AST 52 04/01/2024 04:20 AM    ALT 34 04/01/2024 04:20 AM       No results found for: \"PROTIME\", \"INR\"    Lab Results   Component Value Date/Time    TSH 2.670 03/19/2021 02:55 PM

## 2024-04-01 NOTE — PROGRESS NOTES
[facility]  [address] [id number]  [phone]  FAX [phone]      CHIEF UROLOGIC COMPLAINT: left proximal ureteral stone, sepsis     HISTORY OF PRESENT ILLNESS:  Patient without new complaints.  In the intensive care and feeling much better.  Vital signs improved from last night.    REVIEW OF SYSTEMS:   CONSTITUTIONAL: Weak, tired  HEENT: negative  HEMATOLOGIC: negative  ENDOCRINE: negative  RESPIRATORY: negative  CV: negative  GI: negative  NEURO: negative  ORTHOPEDICS: negative  PSYCHIATRIC: negative  : as above    PAST FAMILY HISTORY:  History reviewed. No pertinent family history.  PAST SOCIAL HISTORY:    Social History     Socioeconomic History    Marital status:      Spouse name: None    Number of children: None    Years of education: None    Highest education level: None   Tobacco Use    Smoking status: Never    Smokeless tobacco: Never   Vaping Use    Vaping Use: Never used   Substance and Sexual Activity    Alcohol use: Never    Drug use: Never     Social Determinants of Health     Food Insecurity: No Food Insecurity (3/31/2024)    Hunger Vital Sign     Worried About Running Out of Food in the Last Year: Never true     Ran Out of Food in the Last Year: Never true   Transportation Needs: No Transportation Needs (3/31/2024)    PRAPARE - Transportation     Lack of Transportation (Medical): No     Lack of Transportation (Non-Medical): No   Housing Stability: Low Risk  (3/31/2024)    Housing Stability Vital Sign     Unable to Pay for Housing in the Last Year: No     Number of Places Lived in the Last Year: 1     Unstable Housing in the Last Year: No       Scheduled Meds:   sodium chloride flush  5-40 mL IntraVENous 2 times per day    hydrocortisone sodium succinate PF  100 mg IntraVENous Q8H    magnesium sulfate  2,000 mg IntraVENous Once    magnesium oxide  400 mg Oral Daily with breakfast    acetaminophen  1,000 mg Oral Once    acetaminophen

## 2024-04-01 NOTE — ED PROVIDER NOTES
3/31/24  9:48 PM EDT      Patient presently awaiting diagnostics and boarded in the department pending bed availability.  Intervention required by emergency physician.     Interval HPI: Patient was received as a signout awaiting imaging.  Patient febrile and leukopenic.  Urinalysis is showing nitrate positive UTI.  While in the department awaiting imaging results patient's blood pressure became hypotensive.  At this point I was asked to intervene on patient's care.    Interval physical exam:     Constitutional/General: Alert and oriented x3    Head: Normocephalic and atraumatic  Eyes: PERRL, EOMI, sclera non icteric  Mouth: Oropharynx clear, handling secretions,   Neck: Supple, full ROM, no stridor, no meningeal signs  Respiratory: ON nasal cannula.  Lungs clear to auscultation bilaterally,Not in respiratory distress    Cardiovascular:  Regular rate. Regular rhythm.  2+ distal pulses. Equal extremity pulses.    GI:  Abdomen Soft, Non tender, Non distended. No rebound, guarding, or rigidity.   Musculoskeletal: Moves all extremities x 4. Warm and well perfused,  Capillary refill <3 seconds  Integument: skin warm and dry. No rashes.   Neurologic: Glascow Coma Scale  Best Eye Response 4 - Opens eyes on own   Best Verbal Response 5 - Alert and oriented   Best Motor Response 6 - Follows simple motor commands   Total 15         Medications   norepinephrine (LEVOPHED) 16 mg in sodium chloride 0.9 % 250 mL infusion (7 mcg/min IntraVENous NoRateChange 3/31/24 2237)   acetaminophen (TYLENOL) tablet 1,000 mg (1,000 mg Oral Not Given 3/31/24 2209)   acetaminophen (TYLENOL) tablet 1,000 mg (1,000 mg Oral Given 3/31/24 1850)   sodium chloride 0.9 % bolus 1,000 mL (0 mLs IntraVENous Stopped 3/31/24 2017)   fentaNYL (SUBLIMAZE) injection 50 mcg (50 mcg IntraVENous Given 3/31/24 1844)   ondansetron (ZOFRAN) injection 4 mg (4 mg IntraVENous Given 3/31/24 1844)   cefTRIAXone (ROCEPHIN) 2,000 mg in sterile water 20 mL IV syringe  2.8*   LACTA 3.2*   CREATININE 0.9   BILITOT 0.8           Septic shock identified date: 3/31 time: 2115    Fluid Resuscitation Rationale: at least 30mL/kg based on entered actual weight at time of triage    Repeat lactate level: ordered and pending at this time    Reassessment Exam: I have reassessed tissue perfusion and hemodynamic status after fluid bolus at this date/time: 21:55      Please note that the withdrawal or failure to initiate urgent interventions for this patient would likely result in a life threatening deterioration or permanent disability.      Accordingly this patient received 31 minutes of critical care time, excluding separately billable procedures.      1. Flank pain    2. Urinary tract infection with hematuria, site unspecified    3. Septic shock (HCC)    4. Obstructive uropathy             Sourav Stanley DO  03/31/24 2243       Sourav Stanley DO  03/31/24 2244       Sourav Stanley DO  03/31/24 2244

## 2024-04-01 NOTE — PROGRESS NOTES
Patient seen in icu appears improved. Tolerated stent. Bp still a little low  on fluids and pressors. Tolerating meds . Continue with current care

## 2024-04-01 NOTE — PROGRESS NOTES
Attending Physician Attestation: Dr. Ang Hilton    Thank you very much for allowing me to see this patient in consultation and follow up.    I personally saw, examined and provided care for the patient. Radiographs, labs and medication list were reviewed by me independently. I spoke with bedside nursing, respiratory therapists and consultants. Critical care services and times documented are independent of procedures and multidisciplinary rounds with Residents. Additionally comprehensive, multidisciplinary rounds were conducted with the MICU team. The case was discussed in detail and plans for care were established. Review of Residents documentation was conducted and revisions were made as appropriate. I agree with the the above documented information.     Current Facility-Administered Medications   Medication Dose Route Frequency Provider Last Rate Last Admin    sodium chloride flush 0.9 % injection 5-40 mL  5-40 mL IntraVENous 2 times per day Diego Randhawa, DO   10 mL at 04/01/24 0832    sodium chloride flush 0.9 % injection 5-40 mL  5-40 mL IntraVENous PRN Diego Randhawa, DO        0.9 % sodium chloride infusion   IntraVENous PRN Diego Randhawa, DO        hydrocortisone sodium succinate PF (SOLU-CORTEF) injection 100 mg  100 mg IntraVENous Q8H Leighton White APRN - CNP   100 mg at 04/01/24 0607    magnesium oxide (MAG-OX) tablet 400 mg  400 mg Oral Daily with breakfast Leighton White APRN - CNP   400 mg at 04/01/24 0832    pantoprazole (PROTONIX) tablet 40 mg  40 mg Oral QAM Jan Valerio MD        norepinephrine (LEVOPHED) 16 mg in sodium chloride 0.9 % 250 mL infusion  1-100 mcg/min IntraVENous Continuous Gerardo Vasquez, DO 14.1 mL/hr at 04/01/24 0839 15 mcg/min at 04/01/24 0839    acetaminophen (TYLENOL) tablet 1,000 mg  1,000 mg Oral Once Gerardo Vasquez, DO        0.9 % sodium chloride infusion   IntraVENous Continuous Leighton hWite APRN -  mL/hr at 04/01/24 0645 Rate Verify at 04/01/24

## 2024-04-01 NOTE — OP NOTE
pyonephrosis.  The patient will need a laser litho versus ESWL in the future.    PLAN:    1. ICU was contacted.  She will be transferred there.  2. Continue IV fluids.  3. Continue antibiotics.  4. ICU doctor will manage her.          TAMEKA SAUNDERS,       D:  03/31/2024 23:27:21     T:  04/01/2024 01:56:35     JAI/PARKER  Job #:  [id number]     Doc#:  [id number]

## 2024-04-01 NOTE — CARE COORDINATION
Transition of Care-Met with patient at the bedside, introduced myself and Cm role in discharge planning. Patient lives with her sister in a two story home, bed/bath set up is on the second floor. Patient reports being independent, uses no DME. No history of HHC or SNF. PCP is Dr. Lott, preferred pharmacy is [facility] on [address]. She is currently requiring levo gtt, albumin, IV steroids Q8 and Rocephin QD. CM following.    Adenike CARLSONN, RN  [facility]

## 2024-04-01 NOTE — PLAN OF CARE
Problem: Pain  Goal: Verbalizes/displays adequate comfort level or baseline comfort level  Outcome: Progressing  Flowsheets  Taken 4/1/2024 1200 by Joce Bhatia RN  Verbalizes/displays adequate comfort level or baseline comfort level: Assess pain using appropriate pain scale  Taken 4/1/2024 0800 by Joce Bhatia RN  Verbalizes/displays adequate comfort level or baseline comfort level: Assess pain using appropriate pain scale  Taken 4/1/2024 0700 by Dorina Russell RN  Verbalizes/displays adequate comfort level or baseline comfort level: Assess pain using appropriate pain scale  Taken 4/1/2024 0600 by Dorina Russell RN  Verbalizes/displays adequate comfort level or baseline comfort level: Assess pain using appropriate pain scale  Taken 4/1/2024 0500 by Dorina Russell RN  Verbalizes/displays adequate comfort level or baseline comfort level: Assess pain using appropriate pain scale  Taken 4/1/2024 0400 by Dorina Russell RN  Verbalizes/displays adequate comfort level or baseline comfort level: Assess pain using appropriate pain scale  Taken 4/1/2024 0300 by Dorina Russell RN  Verbalizes/displays adequate comfort level or baseline comfort level: Assess pain using appropriate pain scale  Taken 4/1/2024 0200 by Dorina Russell RN  Verbalizes/displays adequate comfort level or baseline comfort level: Assess pain using appropriate pain scale     Problem: Discharge Planning  Goal: Discharge to home or other facility with appropriate resources  Outcome: Progressing  Flowsheets  Taken 4/1/2024 0800 by Joce Bhatia RN  Discharge to home or other facility with appropriate resources: Identify barriers to discharge with patient and caregiver  Taken 4/1/2024 0000 by Dorina Russell RN  Discharge to home or other facility with appropriate resources:   Identify barriers to discharge with patient and caregiver   Arrange for needed discharge resources and transportation as appropriate     Problem: ABCDS Injury  Assessment  Goal: Absence of physical injury  Outcome: Progressing     Problem: Safety - Adult  Goal: Free from fall injury  Outcome: Progressing

## 2024-04-01 NOTE — INTERDISCIPLINARY ROUNDS
Intensive Care Daily Quality Rounding Checklist      ICU Team Members: Dr. Hilton, Residents Dr. Italo Ang & Nhi, Charge nurse, Bedside nurse, Clinical pharmacy, Respiratory therapist     ICU Day #: NUMBER: 2    Intubation Date: N/A    Ventilator Day #: N/A    Central Line Insertion Date:  March 31        Day #: NUMBER: 2        Indication: CVCIndication: Hemodynamically unstable requiring volume resuscitation     Arterial Line Insertion Date:  N/A      Day #: N/A    Temporary Hemodialysis Catheter Insertion Date:  N/A      Day # N/A    DVT Prophylaxis: lovenox    GI Prophylaxis: protonix    C[place] Catheter Insertion Date: March 31       Day #: 2      Indications: Need for fluid management of the critically ill patient in a critical care setting      Continued need (if yes, reason documented and discussed with physician): yes, per urology    Skin Issues/ Wounds and ordered treatment discussed on rounds: No    Goals/ Plans for the Day: Monitor labs and vitals, treat/replace as needed. Wean pressors. Add albumin and steroids. Consult to nephrology.     Reviewed plan and goals for day with patient and/or representative: Yes, alert

## 2024-04-01 NOTE — CONSULTS
Nephrology Consult  [facility]  Carrillo Xie MD    CC:   ckd    HPI:   72 yo female with a pmh of htn, gastric bypass,  who presented with L sided flank pain, nausea, and chills. She was hypotensive and started on levo after receiving 2.5 L of ns. She was started on ceftriaxone. Seh is now on ns at 100 ml/hr. Labs show na 135 k 3.9, co2 19 bun 27 cr 0.9>1.2, mg 2.2, lactate 2.2, ca 8.1, p 3.3, alb 3.1, bc 31.4, hgb 15>12.1, plt 157. Ct with dye was done which showed the 8 mm L UPJstone and mod hydro, and possible wall thickening versus underdistention of areas of cecum and ascending colon. Vitals show temp of 98.1, rr 21 hr 72 bp 104/60. Sbp was in the 70s. From 0ct 2023 cr was 0.7. outpt microzide is on hold. Pt is feeling better today, flank pain has improved. She is talking on her cell phone. No cp or sob, wasn't taking any nsaids as an outpt. She does not have a h/o stones. She was on outpt ca due to her gastric bypass.     PMH:    Past Medical History:   Diagnosis Date    Headache     Hypertension        Patient Active Problem List   Diagnosis    Diverticulitis    Diverticulitis of colon    Septic shock (HCC)       Meds:     hydrocortisone sodium succinate PF  100 mg IntraVENous Q8H    magnesium oxide  400 mg Oral Daily with breakfast    pantoprazole  40 mg Oral QAM AC    albumin human 25%  25 g IntraVENous q8h    midodrine  10 mg Oral TID WC    cefTRIAXone (ROCEPHIN) IV  2,000 mg IntraVENous Q24H    sodium chloride flush  5-40 mL IntraVENous 2 times per day    enoxaparin  40 mg SubCUTAneous Daily        norepinephrine 15 mcg/min (04/01/24 0839)    sodium chloride 100 mL/hr at 04/01/24 0645    sodium chloride         Meds prn:     sodium chloride flush, sodium chloride flush, sodium chloride, ondansetron **OR** ondansetron, polyethylene glycol, acetaminophen **OR** acetaminophen    Meds prior to admission:     No current facility-administered medications on file prior to encounter.     Current    04/01/24 0500 100/60 -- -- 66 21 96 % -- --   04/01/24 0400 (!) 99/55 100.1 °F (37.8 °C) Axillary 71 22 96 % -- --   04/01/24 0300 (!) 98/52 -- -- 72 23 96 % -- --   04/01/24 0200 (!) 89/50 100.3 °F (37.9 °C) Axillary 76 25 96 % -- --   04/01/24 0103 (!) 92/49 98.8 °F (37.1 °C) Axillary 80 14 97 % -- --   04/01/24 0045 (!) 89/53 97.9 °F (36.6 °C) Axillary 77 16 97 % -- --   04/01/24 0030 (!) 84/50 98.8 °F (37.1 °C) Axillary 77 18 97 % -- --   04/01/24 0016 (!) 76/52 98.7 °F (37.1 °C) Axillary 77 19 92 % -- --   04/01/24 0000 (!) 84/53 98.8 °F (37.1 °C) Axillary 78 19 92 % -- --   03/31/24 2355 -- -- -- -- -- -- 1.549 m (5' 0.98\") 88.5 kg (195 lb 1.7 oz)   03/31/24 2351 -- -- -- 77 -- -- -- --   03/31/24 2344 (!) 91/51 98.8 °F (37.1 °C) Temporal 79 -- 93 % -- --   03/31/24 2245 (!) 85/57 -- -- 80 26 -- -- --   03/31/24 2240 (!) 89/49 -- -- 80 23 -- -- --   03/31/24 2230 (!) 94/53 -- -- 82 22 -- -- --   03/31/24 2220 102/65 -- -- 89 28 -- -- --   03/31/24 2215 (!) 78/46 -- -- 83 24 -- -- --   03/31/24 2210 (!) 83/51 -- -- 84 25 -- -- --   03/31/24 2205 (!) 70/36 -- -- 85 30 -- -- --   03/31/24 2203 (!) 72/42 (!) 100.9 °F (38.3 °C) -- 85 24 -- -- --   03/31/24 2141 (!) 78/50 -- -- 84 23 -- -- --   03/31/24 2130 (!) 83/41 -- -- 85 19 -- -- --   03/31/24 2115 (!) 86/46 -- -- 84 22 -- -- --   03/31/24 1908 -- -- -- 93 -- 95 % -- --   03/31/24 1907 (!) 119/58 -- -- -- -- 95 % -- --   03/31/24 1735 (!) 151/66 100.4 °F (38 °C) Oral 88 19 98 % -- --   03/31/24 1730 -- -- -- -- -- -- -- 81.6 kg (180 lb)         Intake/Output Summary (Last 24 hours) at 4/1/2024 1211  Last data filed at 4/1/2024 0800  Gross per 24 hour   Intake 1683.91 ml   Output 500 ml   Net 1183.91 ml       Constitutional: Patient in no acute distress   Head: normocephalic, atraumatic   Neck: supple, no jvd  Cardiovascular: regular rate and rhythm, no murmurs, gallops, or rubs   Respiratory: Clear, no rales, rhochi, or wheezes,   Gastrointestinal: soft,

## 2024-04-02 LAB
25(OH)D3 SERPL-MCNC: 28.9 NG/ML (ref 30–100)
ALBUMIN SERPL-MCNC: 3.9 G/DL (ref 3.5–5.2)
ALP SERPL-CCNC: 65 U/L (ref 35–104)
ALT SERPL-CCNC: 23 U/L (ref 0–32)
ANION GAP SERPL CALCULATED.3IONS-SCNC: 6 MMOL/L (ref 7–16)
AST SERPL-CCNC: 25 U/L (ref 0–31)
BASOPHILS # BLD: 0 K/UL (ref 0–0.2)
BASOPHILS NFR BLD: 0 % (ref 0–2)
BILIRUB SERPL-MCNC: 0.4 MG/DL (ref 0–1.2)
BUN SERPL-MCNC: 22 MG/DL (ref 6–23)
CALCIUM SERPL-MCNC: 9.1 MG/DL (ref 8.6–10.2)
CHLORIDE SERPL-SCNC: 111 MMOL/L (ref 98–107)
CO2 SERPL-SCNC: 24 MMOL/L (ref 22–29)
CREAT SERPL-MCNC: 0.8 MG/DL (ref 0.5–1)
EOSINOPHIL # BLD: 0 K/UL (ref 0.05–0.5)
EOSINOPHILS RELATIVE PERCENT: 0 % (ref 0–6)
ERYTHROCYTE [DISTWIDTH] IN BLOOD BY AUTOMATED COUNT: 13.9 % (ref 11.5–15)
GFR SERPL CREATININE-BSD FRML MDRD: 82 ML/MIN/1.73M2
GLUCOSE SERPL-MCNC: 114 MG/DL (ref 74–99)
HCT VFR BLD AUTO: 33.8 % (ref 34–48)
HGB BLD-MCNC: 10.8 G/DL (ref 11.5–15.5)
LACTATE BLDV-SCNC: 2.4 MMOL/L (ref 0.5–2.2)
LACTATE BLDV-SCNC: 2.5 MMOL/L (ref 0.5–2.2)
LACTATE BLDV-SCNC: 2.8 MMOL/L (ref 0.5–2.2)
LYMPHOCYTES NFR BLD: 0.78 K/UL (ref 1.5–4)
LYMPHOCYTES RELATIVE PERCENT: 3 % (ref 20–42)
MAGNESIUM SERPL-MCNC: 2.4 MG/DL (ref 1.6–2.6)
MCH RBC QN AUTO: 29.8 PG (ref 26–35)
MCHC RBC AUTO-ENTMCNC: 32 G/DL (ref 32–34.5)
MCV RBC AUTO: 93.4 FL (ref 80–99.9)
METAMYELOCYTES ABSOLUTE COUNT: 0.52 K/UL (ref 0–0.12)
METAMYELOCYTES: 2 % (ref 0–1)
MICROORGANISM SPEC CULT: NORMAL
MONOCYTES NFR BLD: 1.04 K/UL (ref 0.1–0.95)
MONOCYTES NFR BLD: 4 % (ref 2–12)
NEUTROPHILS NFR BLD: 92 % (ref 43–80)
NEUTS SEG NFR BLD: 27.56 K/UL (ref 1.8–7.3)
PHOSPHATE SERPL-MCNC: 3.5 MG/DL (ref 2.5–4.5)
PLATELET, FLUORESCENCE: 116 K/UL (ref 130–450)
PMV BLD AUTO: 11.3 FL (ref 7–12)
POTASSIUM SERPL-SCNC: 4.2 MMOL/L (ref 3.5–5)
PROT SERPL-MCNC: 6.2 G/DL (ref 6.4–8.3)
PTH-INTACT SERPL-MCNC: 115.1 PG/ML (ref 15–65)
RBC # BLD AUTO: 3.62 M/UL (ref 3.5–5.5)
RBC # BLD: ABNORMAL 10*6/UL
SODIUM SERPL-SCNC: 141 MMOL/L (ref 132–146)
SPECIMEN DESCRIPTION: NORMAL
URATE SERPL-MCNC: 4.9 MG/DL (ref 2.4–5.7)
WBC OTHER # BLD: 29.9 K/UL (ref 4.5–11.5)

## 2024-04-02 PROCEDURE — 84550 ASSAY OF BLOOD/URIC ACID: CPT

## 2024-04-02 PROCEDURE — P9047 ALBUMIN (HUMAN), 25%, 50ML: HCPCS

## 2024-04-02 PROCEDURE — 83605 ASSAY OF LACTIC ACID: CPT

## 2024-04-02 PROCEDURE — 6360000002 HC RX W HCPCS

## 2024-04-02 PROCEDURE — 6360000002 HC RX W HCPCS: Performed by: STUDENT IN AN ORGANIZED HEALTH CARE EDUCATION/TRAINING PROGRAM

## 2024-04-02 PROCEDURE — 99222 1ST HOSP IP/OBS MODERATE 55: CPT | Performed by: INTERNAL MEDICINE

## 2024-04-02 PROCEDURE — 84100 ASSAY OF PHOSPHORUS: CPT

## 2024-04-02 PROCEDURE — 6370000000 HC RX 637 (ALT 250 FOR IP): Performed by: INTERNAL MEDICINE

## 2024-04-02 PROCEDURE — 82542 COL CHROMOTOGRAPHY QUAL/QUAN: CPT

## 2024-04-02 PROCEDURE — 83735 ASSAY OF MAGNESIUM: CPT

## 2024-04-02 PROCEDURE — 6370000000 HC RX 637 (ALT 250 FOR IP)

## 2024-04-02 PROCEDURE — 82306 VITAMIN D 25 HYDROXY: CPT

## 2024-04-02 PROCEDURE — 2580000003 HC RX 258

## 2024-04-02 PROCEDURE — 2580000003 HC RX 258: Performed by: STUDENT IN AN ORGANIZED HEALTH CARE EDUCATION/TRAINING PROGRAM

## 2024-04-02 PROCEDURE — 2060000000 HC ICU INTERMEDIATE R&B

## 2024-04-02 PROCEDURE — 80053 COMPREHEN METABOLIC PANEL: CPT

## 2024-04-02 PROCEDURE — 85025 COMPLETE CBC W/AUTO DIFF WBC: CPT

## 2024-04-02 PROCEDURE — 83970 ASSAY OF PARATHORMONE: CPT

## 2024-04-02 RX ORDER — ERGOCALCIFEROL 1.25 MG/1
50000 CAPSULE ORAL WEEKLY
Status: DISCONTINUED | OUTPATIENT
Start: 2024-04-02 | End: 2024-04-04

## 2024-04-02 RX ORDER — CALCIUM CARBONATE 500(1250)
1000 TABLET ORAL 2 TIMES DAILY
Status: DISCONTINUED | OUTPATIENT
Start: 2024-04-02 | End: 2024-04-04

## 2024-04-02 RX ORDER — METOPROLOL SUCCINATE 25 MG/1
25 TABLET, EXTENDED RELEASE ORAL DAILY
Status: DISCONTINUED | OUTPATIENT
Start: 2024-04-02 | End: 2024-04-04

## 2024-04-02 RX ORDER — VALACYCLOVIR HYDROCHLORIDE 500 MG/1
500 TABLET, FILM COATED ORAL 2 TIMES DAILY
Status: COMPLETED | OUTPATIENT
Start: 2024-04-02 | End: 2024-04-03

## 2024-04-02 RX ADMIN — ACETAMINOPHEN 650 MG: 325 TABLET ORAL at 08:10

## 2024-04-02 RX ADMIN — ERGOCALCIFEROL 50000 UNITS: 1.25 CAPSULE ORAL at 21:55

## 2024-04-02 RX ADMIN — ALBUMIN (HUMAN) 25 G: 0.25 INJECTION, SOLUTION INTRAVENOUS at 03:30

## 2024-04-02 RX ADMIN — WATER 2000 MG: 1 INJECTION INTRAMUSCULAR; INTRAVENOUS; SUBCUTANEOUS at 21:55

## 2024-04-02 RX ADMIN — CALCIUM 1000 MG: 500 TABLET ORAL at 21:55

## 2024-04-02 RX ADMIN — MAGNESIUM OXIDE 400 MG (241.3 MG MAGNESIUM) TABLET 400 MG: TABLET at 08:10

## 2024-04-02 RX ADMIN — ENOXAPARIN SODIUM 40 MG: 100 INJECTION SUBCUTANEOUS at 08:10

## 2024-04-02 RX ADMIN — SODIUM CHLORIDE: 9 INJECTION, SOLUTION INTRAVENOUS at 05:23

## 2024-04-02 RX ADMIN — HYDROCORTISONE SODIUM SUCCINATE 50 MG: 100 INJECTION, POWDER, FOR SOLUTION INTRAMUSCULAR; INTRAVENOUS at 21:55

## 2024-04-02 RX ADMIN — HYDROCORTISONE SODIUM SUCCINATE 100 MG: 100 INJECTION, POWDER, FOR SOLUTION INTRAMUSCULAR; INTRAVENOUS at 05:06

## 2024-04-02 RX ADMIN — SODIUM CHLORIDE, PRESERVATIVE FREE 10 ML: 5 INJECTION INTRAVENOUS at 08:10

## 2024-04-02 RX ADMIN — FAMOTIDINE 20 MG: 20 TABLET ORAL at 08:10

## 2024-04-02 ASSESSMENT — PAIN SCALES - GENERAL
PAINLEVEL_OUTOF10: 4
PAINLEVEL_OUTOF10: 0
PAINLEVEL_OUTOF10: 0
PAINLEVEL_OUTOF10: 2
PAINLEVEL_OUTOF10: 0

## 2024-04-02 ASSESSMENT — PAIN DESCRIPTION - LOCATION
LOCATION: HEAD
LOCATION: HEAD

## 2024-04-02 NOTE — PLAN OF CARE
Problem: Pain  Goal: Verbalizes/displays adequate comfort level or baseline comfort level  Outcome: Progressing  Flowsheets (Taken 4/1/2024 1600 by Joce Bhatia, RN)  Verbalizes/displays adequate comfort level or baseline comfort level: Assess pain using appropriate pain scale     Problem: Discharge Planning  Goal: Discharge to home or other facility with appropriate resources  Outcome: Progressing     Problem: ABCDS Injury Assessment  Goal: Absence of physical injury  Outcome: Progressing  Flowsheets (Taken 4/2/2024 0255)  Absence of Physical Injury: Implement safety measures based on patient assessment     Problem: Safety - Adult  Goal: Free from fall injury  Outcome: Progressing  Flowsheets (Taken 4/2/2024 0255)  Free From Fall Injury: Instruct family/caregiver on patient safety

## 2024-04-02 NOTE — PATIENT CARE CONFERENCE
Intensive Care Daily Quality Rounding Checklist        ICU Team Members: Dr. Hilton, Residents Dr. Italo Ang & Nhi, Charge nurse, Bedside nurse, Clinical pharmacy, Respiratory therapist      ICU Day #: 3     Intubation Date: N/A     Ventilator Day #: N/A     Central Line Insertion Date:  March 31                                                    Day #: 3 - Remove                                                    Indication: CVCIndication: Hemodynamically unstable requiring volume resuscitation      Arterial Line Insertion Date:  N/A                             Day #: N/A     Temporary Hemodialysis Catheter Insertion Date:  N/A                             Day # N/A     DVT Prophylaxis: lovenox    GI Prophylaxis: protonix     C[place]urn Catheter Insertion Date: March 31                                        Day #: 3                             Indications: Need for fluid management of the critically ill patient in a critical care setting                             Continued need (if yes, reason documented and discussed with physician): yes, per urology     Skin Issues/ Wounds and ordered treatment discussed on rounds: No     Goals/ Plans for the Day: Monitor labs and vitals, treat/replace as needed. Transfer to intermediate.     Reviewed plan and goals for day with patient and/or representative: Yes, alert

## 2024-04-02 NOTE — PROGRESS NOTES
4/2/2024 10:00 AM  Service: Urology  Group: [facility] ([facility])    Mary Luu  [id number]    Subjective:  Pt is up in the chair  She is feeling tired  No complaints of flank pain  She is tolerating a diet      Review of Systems  Constitutional: No fever or chills   Respiratory: negative for cough and shortness of breath  Cardiovascular: negative  Gastrointestinal: negative for abdominal pain, nausea, and vomiting  Dermatologic: negative   Hematologic/lymphatic: negative  Musculoskeletal:negative for back pain  Neurological: negative for seizures and tremors  Endocrine: negative  Psychiatric: negative   : See above    Scheduled Meds:   hydrocortisone sodium succinate PF  100 mg IntraVENous Q8H    magnesium oxide  400 mg Oral Daily with breakfast    albumin human 25%  25 g IntraVENous q8h    midodrine  10 mg Oral TID WC    cefTRIAXone (ROCEPHIN) IV  2,000 mg IntraVENous Q24H    famotidine  20 mg Oral Daily    sodium chloride flush  5-40 mL IntraVENous 2 times per day    enoxaparin  40 mg SubCUTAneous Daily       Objective:  Vitals:    04/02/24 0900   BP: 106/66   Pulse: 57   Resp: 23   Temp:    SpO2: 91%         Allergies: Patient has no known allergies.    General Appearance: alert and oriented to person, place and time and in no acute distress  Skin: no rash or erythema  Head: normocephalic and atraumatic  Pulmonary/Chest: normal air movement, no respiratory distress   Abdomen: soft, non-tender, non-distended  Genitourinary: hurtado catheter draining yellow urine  Extremities: no cyanosis, clubbing or edema     Labs:     Recent Labs     04/02/24  0512      K 4.2   *   CO2 24   BUN 22   CREATININE 0.8   GLUCOSE 114*   CALCIUM 9.1       Lab Results   Component Value Date/Time    HGB 10.8 04/02/2024 05:12 AM    HCT 33.8 04/02/2024 05:12 AM       4/2/24 0512    Pth Intact  15 - 65 pg/mL 115.1 High        BLOOD P  Migdalia Anderson [facility]    Special Requests      P  Migdalia Rodriguez

## 2024-04-02 NOTE — H&P
[place] [address]                           HISTORY & PHYSICAL      PATIENT NAME: ALIREZA GONZALEZ            : 1950  MED REC NO: [id number]                        ROOM: 0207  ACCOUNT NO: [id number]                       ADMIT DATE: 2024  PROVIDER: Rob Lott DO      HISTORY OF PRESENT ILLNESS:  A 73-year-old white female, recently in October was admitted for E coli bacteremia related to diverticulitis and left-sided pyelonephritis.  The patient was treated appropriately and seemed to do well.  However, yesterday, came into the Emergency Department after having a 1-day history of left flank pain, radiating into the left groin, associated with shaking chills and fever.  The patient was febrile on admission, hypotensive, tachycardic.  White count was initially low at 2.8.  Liver enzymes slightly elevated.  CT of the abdomen and pelvis showed left ureteral stone.  The patient was immediately admitted with anticipated surgical intervention to place a stent and get Infectious Disease consult for what is likely sepsis.    RECENT CURRENT MEDICATIONS:  Magnesium oxide 400 mg daily, pantoprazole 400 daily, midodrine 10 daily.    PREVIOUS MEDICAL HISTORY:  Significant for hypertension, diverticulitis, and left-sided pyelonephritis.    PAST SURGICAL HISTORY:  Significant for breast surgery, gastric bypass surgery, hysterectomy, joint replacement, rhinoplasty, shoulder surgery, cystoscopy.    SOCIAL HISTORY:  The patient is a nonsmoker, nondrinker.  Denies use of narcotic drugs or alcoholic beverages.    FAMILY HISTORY:  Discussed.    REVIEW OF SYSTEMS:  CONSTITUTIONAL:  Negative for vertigo, cephalgia, ringing in the ears, hearing loss, difficulty swallowing, hoarseness in her voice, bloody noses.  CARDIOVASCULAR:  She currently has no chest pain, palpitations, orthopnea, paroxysmal nocturnal dyspnea, or  condition is guarded and her prognosis is guarded.          JACK BEGUM DO      D:  04/02/2024 08:22:44     T:  04/02/2024 08:44:59     MELA/PARKER  Job #:  [id number]     Doc#:  [id number]

## 2024-04-02 NOTE — PROGRESS NOTES
CI HR MAP TPRI SVI TFC   Baseline 5.1 72 75 1167 71 48.4   Test 5.5 74 76 1115 76 49.3   % Change 6.1 2.8 1.3 -4.5 6.1 1.8   noninvasive -  discontinue

## 2024-04-02 NOTE — PROGRESS NOTES
[facility]  Nephrology Attending Progress Note  Carrillo Diaz MD        SUBJECTIVE:     4/1/24: 74 yo female with a pmh of htn, gastric bypass,  who presented with L sided flank pain, nausea, and chills. She was hypotensive and started on levo after receiving 2.5 L of ns. She was started on ceftriaxone. Seh is now on ns at 100 ml/hr. Labs show na 135 k 3.9, co2 19 bun 27 cr 0.9>1.2, mg 2.2, lactate 2.2, ca 8.1, p 3.3, alb 3.1, bc 31.4, hgb 15>12.1, plt 157. Ct with dye was done which showed the 8 mm L UPJstone and mod hydro, and possible wall thickening versus underdistention of areas of cecum and ascending colon. Vitals show temp of 98.1, rr 21 hr 72 bp 104/60. Sbp was in the 70s. From 0ct 2023 cr was 0.7. outpt microzide is on hold. Pt is feeling better today, flank pain has improved. She is talking on her cell phone. No cp or sob, wasn't taking any nsaids as an outpt. She does not have a h/o stones. She was on outpt ca due to her gastric bypass.     4/2/24 :pt seen and examined in icu, flank pain on L much better. No n/v/d, sitting in chair, starting to eat     PROBLEM LIST:    Patient Active Problem List   Diagnosis    Diverticulitis    Diverticulitis of colon    Septic shock (HCC)        PAST MEDICAL HISTORY:    Past Medical History:   Diagnosis Date    Headache     Hypertension        DIET:    ADULT DIET; Full Liquid     PHYSICAL EXAM:     Patient Vitals for the past 24 hrs:   BP Temp Temp src Pulse Resp SpO2   04/02/24 0800 120/88 98.1 °F (36.7 °C) Oral 55 20 93 %   04/02/24 0700 126/65 -- -- 50 24 91 %   04/02/24 0200 108/62 -- -- 62 20 91 %   04/02/24 0100 111/63 -- -- 63 20 90 %   04/02/24 0000 114/61 97.8 °F (36.6 °C) Oral 65 28 94 %   04/01/24 2300 (!) 101/59 -- -- 67 23 93 %   04/01/24 2200 (!) 97/53 -- -- 65 24 91 %   04/01/24 2116 -- -- -- -- -- 92 %   04/01/24 2100 -- -- -- -- -- 94 %   04/01/24 2000 -- -- -- -- -- 93 %   04/01/24 1900 104/60 -- -- 71 25 94 %   04/01/24 1800 (!) 105/59 --

## 2024-04-02 NOTE — PROGRESS NOTES
Attending Physician Attestation: Dr. Ang Hilton    Thank you very much for allowing me to see this patient in consultation and follow up.    I personally saw, examined and provided care for the patient. Radiographs, labs and medication list were reviewed by me independently. I spoke with bedside nursing, respiratory therapists and consultants. Critical care services and times documented are independent of procedures and multidisciplinary rounds with Residents. Additionally comprehensive, multidisciplinary rounds were conducted with the MICU team. The case was discussed in detail and plans for care were established. Review of Residents documentation was conducted and revisions were made as appropriate. I agree with the the above documented information.     Current Facility-Administered Medications   Medication Dose Route Frequency Provider Last Rate Last Admin    sodium chloride flush 0.9 % injection 5-40 mL  5-40 mL IntraVENous PRN Diego Randhawa DO        hydrocortisone sodium succinate PF (SOLU-CORTEF) injection 100 mg  100 mg IntraVENous Q8H Leighton White APRN - CNP   100 mg at 04/02/24 0506    magnesium oxide (MAG-OX) tablet 400 mg  400 mg Oral Daily with breakfast Leighton White APRN - CNP   400 mg at 04/02/24 0810    albumin human 25% IV solution 25 g  25 g IntraVENous q8h Jan Hankins MD   Stopped at 04/02/24 0428    midodrine (PROAMATINE) tablet 10 mg  10 mg Oral TID  Jan Hankins MD   10 mg at 04/01/24 1642    cefTRIAXone (ROCEPHIN) 2,000 mg in sterile water 20 mL IV syringe  2,000 mg IntraVENous Q24H Osmar Blackmon MD   2,000 mg at 04/01/24 2057    famotidine (PEPCID) tablet 20 mg  20 mg Oral Daily Ang Hilton MD   20 mg at 04/02/24 0810    norepinephrine (LEVOPHED) 16 mg in sodium chloride 0.9 % 250 mL infusion  1-100 mcg/min IntraVENous Continuous Gerardo Vasquez DO   Stopped at 04/01/24 1830    0.9 % sodium chloride infusion   IntraVENous Continuous Jan Hankins  decreased breath sounds at lung bases, no focal wheezes noted  Extremities: no clubbing/cyanosis/edema  Neuro: awake, alert     ASSESSMENT:  Severe Sepsis with Septic Shock - complicated cystitis  E. Coli Bacteremia/Septicemia   ID consultation attained  Continue ceftriaxone   Obstructive Uropathy   Left Hydronephrosis with Nephrolithiasis   Anion gap Metabolic Acidosis   Hypoalbuminemia   Leukocytosis   Hypomagnesemia   H/O Gastric Bypass Surgery around 2011    In addition the following applies:    Check: serial labs   Medication Alterations: solu-cortef to 50 mg IV BID, D/C midodrine   Procedures:   1. Cysto.  2. Left stent insertion.  Imaging: reviewed  New Consultations:  VENT: N/A    Access: Right Femoral TLC (4/1/2024)  - D/C femoral TLC upon transfer to floors   Consults: Urology, Nephrology    Drips: N/A  DVT Phylaxis: Lovenox   GI Prophylaxis: PPI  Nutrition: NPO  ABX: Ceftriaxone     - OK to floors     Thank you for allowing me to participate in the care of this patient.    Care reviewed with nursing staff, medical and surgical specialty care, primary care and the patient's family as available. Restraints are ordered when the patient can do harm to him/herself by pulling out devices.    Ang Hilton M.D.    Ang Hilton MD  4/2/2024  10:10 AM

## 2024-04-02 NOTE — CONSULTS
ENDOCRINOLOGY INITIAL CONSULTATION NOTE    Date of Admission: 3/31/2024  Date of Service: 4/2/2024  Admitting Physician: Rob Lott DO   Primary Care Physician: Rob Lott DO  Consultant physician: Jay Sánchez MD     Reason for the consultation:  Hyperparathyroidism    History of Present Illness:  The history is provided by the patient. Accuracy of the patient data is excellent.  Mary Luu is a very pleasant 73 y.o. old female with PMH of HTN, obesity s/p gastric bypass surgery, vitD deficiency and other listed below admitted to [facility] on 3/31/2024 because of  sided flank pain, nausea, and chills and low BP, endocrine service was consulted for evaluation and management of hyperparathyroidism      10/14/23 05:40 03/31/24 18:39 04/01/24 00:34 04/01/24 04:20 04/02/24 05:12   Creatinine 0.7 0.9 1.2 (H) 1.2 (H) 0.8   Est, Glom Filt Rate >60 64 49 (L) 46 (L) 82   CALCIUM, SERUM 8.6 10.1 8.1 (L) 8.1 (L) 9.1   Albumin 3.0 (L) 4.2 3.2 (L) 3.1 (L) 3.9   Pth Intact     115.1 (H)   Vit D, 25-Hydroxy     28.9 (L)     The patient underwent gastric bypass surgery in 2011 and was able to lost ~ 100 Ibs.     The patient reported fatigue and tiredness but denied history of polydipsia, polyuria, nausea, vomiting, muscle weakness, bone pain, confusion, unintentional weight loss, fragility fractures, nephrolithiasis, osteoporosis or peptic ulcer disease    The patient has no family history of hypercalcemia and has not been on any medications known to cause hypercalcemia.     The patient is up to date on age appropriate cancer screens including colonoscopy     Past Medical History   Past Medical History:   Diagnosis Date    Headache     Hypertension        Past Surgical History   Past Surgical History:   Procedure Laterality Date    BREAST SURGERY      CYSTOSCOPY W/ URETERAL STENT PLACEMENT Left 03/31/2024    GASTRIC BYPASS SURGERY  2011    HYSTERECTOMY (CERVIX STATUS UNKNOWN)      Total    JOINT REPLACEMENT       management of hyperparathyroidism     Hyperparathyroidism   Likely secondary to vitamin D deficiency and low calcium absorption secondary to the gastric bypass surgery.  Before the admission, the patient was not taking vitamin D supplements and also was taking only calcium 500 twice daily, which is not enough in the setting of a history of gastric bypass surgery.  We recommend the following  Vitamin D 50,000 U/wk. x 4 weeks, then every other week after that   Start calcium 1000 mg BID.   The patient will need 24-hour urine calcium a few months after discharge to assess the adequacy of the Ca supplement.    VitD deficiency  vitD replacement as per above     Interdisciplinary plan for communication with healthcare providers:   Consult recommendations were discussed with the Primary Service/Nursing staff      The above issues were reviewed with the patient who understood and agreed with the plan.  Thank you for allowing us to participate in the care of this patient. Please do not hesitate to contact us with any additional questions.     Jay Sánchez MD  Endocrinologist, [facility]   [address] [place] [id number]   Phone: [phone]  Fax: [phone]  ---------------------------------  An electronic signature was used to authenticate this note. Jay Sánchez MD on 4/2/2024 at 5:46 PM

## 2024-04-02 NOTE — PROGRESS NOTES
Patient sen tolerating meds. Temp down wbc still elevated. Tolerating meds. Blood cultures positive for e coli. Calcium normal but pth elevated, maybe hyperparathyroidism.will get endo to see.

## 2024-04-02 NOTE — PROGRESS NOTES
H&H, transfuse if less than 7  Lovenox for DVT prophylaxis    Endocrine  Solu-Cortef 50 mg twice daily  Monitor BGL per protocol    Social/Spiritual/DNR/Other  Code status: Full Code  Diet ADULT DIET; Regular  Stress ulcer prophylaxis: Pepcid  DVT prophylaxis: pharmacologic prophylaxis (with the following: enoxaparin) and intermittent pneumatic compression boots  Consultations needed: Yes  Transfer out of ICU today? Yes    Lines/catheters  3/31: Coburn catheter  3/31: Left ureteral stent  3/31: Right femoral triple-lumen--plan to remove today  Peripheral IVs    Jan Hankins MD  1:28 PM  04/02/24

## 2024-04-02 NOTE — PROGRESS NOTES
Infectious Disease  Progress Note  [facility]    Chief Complaint: bacteremia    Subjective:  she is still in ICU. But off pressors. Sitting in chair. No fever overnight. She did not sleep well overnight.     Scheduled Meds:   hydrocortisone sodium succinate PF  100 mg IntraVENous Q8H    magnesium oxide  400 mg Oral Daily with breakfast    albumin human 25%  25 g IntraVENous q8h    midodrine  10 mg Oral TID WC    cefTRIAXone (ROCEPHIN) IV  2,000 mg IntraVENous Q24H    famotidine  20 mg Oral Daily    sodium chloride flush  5-40 mL IntraVENous 2 times per day    enoxaparin  40 mg SubCUTAneous Daily     Continuous Infusions:   norepinephrine Stopped (04/01/24 1830)    sodium chloride 125 mL/hr at 04/02/24 0649    sodium chloride       PRN Meds:sodium chloride flush, sodium chloride flush, sodium chloride, ondansetron **OR** ondansetron, polyethylene glycol, acetaminophen **OR** acetaminophen    Prior to Admission medications    Medication Sig Start Date End Date Taking? Authorizing Provider   ferrous fumarate-vitamin c (YARELI-SEQUELS) 65-25 MG TBCR CR tablet Take 1 tablet by mouth daily (with breakfast) Takes 29 mg daily    Sean Dee MD   Cholecalciferol (VITAMIN D3) 1.25 MG (40160 UT) CAPS Take 1 capsule by mouth Daily    Sean Dee MD   hydroCHLOROthiazide (MICROZIDE) 12.5 MG capsule Take 1 capsule by mouth daily    Sean Dee MD   calcium carbonate (OSCAL) 500 MG TABS tablet Take 1 tablet by mouth 2 times daily    Sean Dee MD   Multiple Vitamins-Minerals (THERAPEUTIC MULTIVITAMIN-MINERALS) tablet Take 1 tablet by mouth 2 times daily    Sean Dee MD   Ascorbic Acid (VITAMIN C) 250 MG tablet Take 4 tablets by mouth daily    Sean Dee MD   vitamin B-12 (CYANOCOBALAMIN) 100 MCG tablet Take 10 tablets by mouth three times a week Monday, Wednesday, Friday    Sean Dee MD   magnesium oxide (MAG-OX) 400 MG tablet Take 250 mg by mouth daily

## 2024-04-02 NOTE — CARE COORDINATION
Transition of Care-Transfer to [facility] once bed becomes available. Plan to wean IV steroids to Q12 tomorrow. Rocephin continues. Patient successfully weaned off levo. She stated she is independent from home, resides with her sister. PT/OT ordered to assist in discharge planning.    Adenike FRASER, RN  [facility]

## 2024-04-02 NOTE — PLAN OF CARE
Problem: Pain  Goal: Verbalizes/displays adequate comfort level or baseline comfort level  4/2/2024 0822 by Becka Lincoln, RN  Outcome: Progressing  Flowsheets (Taken 4/2/2024 0800)  Verbalizes/displays adequate comfort level or baseline comfort level: Assess pain using appropriate pain scale

## 2024-04-03 LAB
ACB COMPLEX DNA BLD POS QL NAA+NON-PROBE: NOT DETECTED
ALBUMIN SERPL-MCNC: 3.6 G/DL (ref 3.5–5.2)
ALP SERPL-CCNC: 114 U/L (ref 35–104)
ALT SERPL-CCNC: 21 U/L (ref 0–32)
ANION GAP SERPL CALCULATED.3IONS-SCNC: 9 MMOL/L (ref 7–16)
AST SERPL-CCNC: 25 U/L (ref 0–31)
B FRAGILIS DNA BLD POS QL NAA+NON-PROBE: NOT DETECTED
BASOPHILS # BLD: 0 K/UL (ref 0–0.2)
BASOPHILS NFR BLD: 0 % (ref 0–2)
BILIRUB SERPL-MCNC: 0.4 MG/DL (ref 0–1.2)
BIOFIRE TEST COMMENT: ABNORMAL
BLACTX-M ISLT/SPM QL: NOT DETECTED
BLAIMP ISLT/SPM QL: NOT DETECTED
BLAKPC ISLT/SPM QL: NOT DETECTED
BLAOXA-48-LIKE ISLT/SPM QL: NOT DETECTED
BLAVIM ISLT/SPM QL: NOT DETECTED
BUN SERPL-MCNC: 23 MG/DL (ref 6–23)
C ALBICANS DNA BLD POS QL NAA+NON-PROBE: NOT DETECTED
C AURIS DNA BLD POS QL NAA+NON-PROBE: NOT DETECTED
C GATTII+NEOFOR DNA BLD POS QL NAA+N-PRB: NOT DETECTED
C GLABRATA DNA BLD POS QL NAA+NON-PROBE: NOT DETECTED
C KRUSEI DNA BLD POS QL NAA+NON-PROBE: NOT DETECTED
C PARAP DNA BLD POS QL NAA+NON-PROBE: NOT DETECTED
C TROPICLS DNA BLD POS QL NAA+NON-PROBE: NOT DETECTED
CALCIUM SERPL-MCNC: 9.3 MG/DL (ref 8.6–10.2)
CHLORIDE SERPL-SCNC: 112 MMOL/L (ref 98–107)
CO2 SERPL-SCNC: 24 MMOL/L (ref 22–29)
COLISTIN RES MCR-1 ISLT/SPM QL: NOT DETECTED
CREAT SERPL-MCNC: 0.7 MG/DL (ref 0.5–1)
E CLOAC COMP DNA BLD POS NAA+NON-PROBE: NOT DETECTED
E COLI DNA BLD POS QL NAA+NON-PROBE: DETECTED
E FAECALIS DNA BLD POS QL NAA+NON-PROBE: NOT DETECTED
E FAECIUM DNA BLD POS QL NAA+NON-PROBE: NOT DETECTED
ENTEROBACTERALES DNA BLD POS NAA+N-PRB: DETECTED
EOSINOPHIL # BLD: 0 K/UL (ref 0.05–0.5)
EOSINOPHILS RELATIVE PERCENT: 0 % (ref 0–6)
ERYTHROCYTE [DISTWIDTH] IN BLOOD BY AUTOMATED COUNT: 13.6 % (ref 11.5–15)
GFR SERPL CREATININE-BSD FRML MDRD: >90 ML/MIN/1.73M2
GLUCOSE SERPL-MCNC: 105 MG/DL (ref 74–99)
GP B STREP DNA BLD POS QL NAA+NON-PROBE: NOT DETECTED
HAEM INFLU DNA BLD POS QL NAA+NON-PROBE: NOT DETECTED
HCT VFR BLD AUTO: 36.9 % (ref 34–48)
HGB BLD-MCNC: 11.8 G/DL (ref 11.5–15.5)
K OXYTOCA DNA BLD POS QL NAA+NON-PROBE: NOT DETECTED
KLEBSIELLA SP DNA BLD POS QL NAA+NON-PRB: NOT DETECTED
KLEBSIELLA SP DNA BLD POS QL NAA+NON-PRB: NOT DETECTED
L MONOCYTOG DNA BLD POS QL NAA+NON-PROBE: NOT DETECTED
LACTATE BLDV-SCNC: 1.6 MMOL/L (ref 0.5–2.2)
LACTATE BLDV-SCNC: 2.2 MMOL/L (ref 0.5–2.2)
LYMPHOCYTES NFR BLD: 2.41 K/UL (ref 1.5–4)
LYMPHOCYTES RELATIVE PERCENT: 9 % (ref 20–42)
MAGNESIUM SERPL-MCNC: 1.9 MG/DL (ref 1.6–2.6)
MCH RBC QN AUTO: 29.6 PG (ref 26–35)
MCHC RBC AUTO-ENTMCNC: 32 G/DL (ref 32–34.5)
MCV RBC AUTO: 92.7 FL (ref 80–99.9)
MICROORGANISM SPEC CULT: ABNORMAL
MICROORGANISM/AGENT SPEC: ABNORMAL
MICROORGANISM/AGENT SPEC: ABNORMAL
MONOCYTES NFR BLD: 0.24 K/UL (ref 0.1–0.95)
MONOCYTES NFR BLD: 1 % (ref 2–12)
N MEN DNA BLD POS QL NAA+NON-PROBE: NOT DETECTED
NEUTROPHILS NFR BLD: 90 % (ref 43–80)
NEUTS SEG NFR BLD: 25.05 K/UL (ref 1.8–7.3)
P AERUGINOSA DNA BLD POS NAA+NON-PROBE: NOT DETECTED
PHOSPHATE SERPL-MCNC: 2 MG/DL (ref 2.5–4.5)
PLATELET, FLUORESCENCE: 141 K/UL (ref 130–450)
PMV BLD AUTO: 11.3 FL (ref 7–12)
POTASSIUM SERPL-SCNC: 3.9 MMOL/L (ref 3.5–5)
PROT SERPL-MCNC: 6.4 G/DL (ref 6.4–8.3)
PROTEUS SP DNA BLD POS QL NAA+NON-PROBE: NOT DETECTED
RBC # BLD AUTO: 3.98 M/UL (ref 3.5–5.5)
RBC # BLD: NORMAL 10*6/UL
RESISTANT GENE NDM BY PCR: NOT DETECTED
S AUREUS DNA BLD POS QL NAA+NON-PROBE: NOT DETECTED
S AUREUS+CONS DNA BLD POS NAA+NON-PROBE: NOT DETECTED
S EPIDERMIDIS DNA BLD POS QL NAA+NON-PRB: NOT DETECTED
S LUGDUNENSIS DNA BLD POS QL NAA+NON-PRB: NOT DETECTED
S MALTOPHILIA DNA BLD POS QL NAA+NON-PRB: NOT DETECTED
S MARCESCENS DNA BLD POS NAA+NON-PROBE: NOT DETECTED
S PNEUM DNA BLD POS QL NAA+NON-PROBE: NOT DETECTED
S PYO DNA BLD POS QL NAA+NON-PROBE: NOT DETECTED
SALMONELLA DNA BLD POS QL NAA+NON-PROBE: NOT DETECTED
SERVICE CMNT-IMP: ABNORMAL
SERVICE CMNT-IMP: ABNORMAL
SODIUM SERPL-SCNC: 145 MMOL/L (ref 132–146)
SPECIMEN DESCRIPTION: ABNORMAL
STREPTOCOCCUS DNA BLD POS NAA+NON-PROBE: NOT DETECTED
WBC # BLD: ABNORMAL 10*3/UL
WBC OTHER # BLD: 27.7 K/UL (ref 4.5–11.5)

## 2024-04-03 PROCEDURE — 83735 ASSAY OF MAGNESIUM: CPT

## 2024-04-03 PROCEDURE — 6370000000 HC RX 637 (ALT 250 FOR IP): Performed by: INTERNAL MEDICINE

## 2024-04-03 PROCEDURE — 83605 ASSAY OF LACTIC ACID: CPT

## 2024-04-03 PROCEDURE — 85025 COMPLETE CBC W/AUTO DIFF WBC: CPT

## 2024-04-03 PROCEDURE — 6370000000 HC RX 637 (ALT 250 FOR IP): Performed by: GENERAL PRACTICE

## 2024-04-03 PROCEDURE — 6370000000 HC RX 637 (ALT 250 FOR IP)

## 2024-04-03 PROCEDURE — 2580000003 HC RX 258

## 2024-04-03 PROCEDURE — 6360000002 HC RX W HCPCS

## 2024-04-03 PROCEDURE — 6360000002 HC RX W HCPCS: Performed by: STUDENT IN AN ORGANIZED HEALTH CARE EDUCATION/TRAINING PROGRAM

## 2024-04-03 PROCEDURE — 80053 COMPREHEN METABOLIC PANEL: CPT

## 2024-04-03 PROCEDURE — 99232 SBSQ HOSP IP/OBS MODERATE 35: CPT | Performed by: INTERNAL MEDICINE

## 2024-04-03 PROCEDURE — 84100 ASSAY OF PHOSPHORUS: CPT

## 2024-04-03 PROCEDURE — 2580000003 HC RX 258: Performed by: STUDENT IN AN ORGANIZED HEALTH CARE EDUCATION/TRAINING PROGRAM

## 2024-04-03 PROCEDURE — 6370000000 HC RX 637 (ALT 250 FOR IP): Performed by: STUDENT IN AN ORGANIZED HEALTH CARE EDUCATION/TRAINING PROGRAM

## 2024-04-03 PROCEDURE — 2060000000 HC ICU INTERMEDIATE R&B

## 2024-04-03 RX ORDER — DIMETHICONE, OXYBENZONE, AND PADIMATE O 2; 2.5; 6.6 G/100G; G/100G; G/100G
STICK TOPICAL PRN
Status: DISCONTINUED | OUTPATIENT
Start: 2024-04-03 | End: 2024-04-05 | Stop reason: HOSPADM

## 2024-04-03 RX ORDER — VALACYCLOVIR HYDROCHLORIDE 500 MG/1
500 TABLET, FILM COATED ORAL 2 TIMES DAILY
Status: DISCONTINUED | OUTPATIENT
Start: 2024-04-03 | End: 2024-04-03

## 2024-04-03 RX ORDER — LACTOBACILLUS RHAMNOSUS GG 10B CELL
1 CAPSULE ORAL
Status: DISCONTINUED | OUTPATIENT
Start: 2024-04-04 | End: 2024-04-05 | Stop reason: HOSPADM

## 2024-04-03 RX ORDER — VALACYCLOVIR HYDROCHLORIDE 500 MG/1
1000 TABLET, FILM COATED ORAL DAILY
Status: COMPLETED | OUTPATIENT
Start: 2024-04-03 | End: 2024-04-03

## 2024-04-03 RX ORDER — LACTOBACILLUS RHAMNOSUS GG 10B CELL
1 CAPSULE ORAL
Status: DISCONTINUED | OUTPATIENT
Start: 2024-04-04 | End: 2024-04-03 | Stop reason: SDUPTHER

## 2024-04-03 RX ORDER — VALACYCLOVIR HYDROCHLORIDE 500 MG/1
1500 TABLET, FILM COATED ORAL 2 TIMES DAILY
Status: COMPLETED | OUTPATIENT
Start: 2024-04-03 | End: 2024-04-03

## 2024-04-03 RX ADMIN — ACETAMINOPHEN 650 MG: 325 TABLET ORAL at 06:21

## 2024-04-03 RX ADMIN — MAGNESIUM OXIDE 400 MG (241.3 MG MAGNESIUM) TABLET 400 MG: TABLET at 07:56

## 2024-04-03 RX ADMIN — VALACYCLOVIR HYDROCHLORIDE 1000 MG: 500 TABLET, FILM COATED ORAL at 15:58

## 2024-04-03 RX ADMIN — METOPROLOL SUCCINATE 25 MG: 25 TABLET, EXTENDED RELEASE ORAL at 07:56

## 2024-04-03 RX ADMIN — SODIUM CHLORIDE, PRESERVATIVE FREE 10 ML: 5 INJECTION INTRAVENOUS at 00:38

## 2024-04-03 RX ADMIN — POTASSIUM & SODIUM PHOSPHATES POWDER PACK 280-160-250 MG 500 MG: 280-160-250 PACK at 18:22

## 2024-04-03 RX ADMIN — VALACYCLOVIR HYDROCHLORIDE 1500 MG: 500 TABLET, FILM COATED ORAL at 21:28

## 2024-04-03 RX ADMIN — VALACYCLOVIR HYDROCHLORIDE 500 MG: 500 TABLET, FILM COATED ORAL at 00:37

## 2024-04-03 RX ADMIN — VALACYCLOVIR HYDROCHLORIDE 500 MG: 500 TABLET, FILM COATED ORAL at 13:48

## 2024-04-03 RX ADMIN — FAMOTIDINE 20 MG: 20 TABLET ORAL at 07:57

## 2024-04-03 RX ADMIN — Medication: at 00:37

## 2024-04-03 RX ADMIN — CALCIUM 1000 MG: 500 TABLET ORAL at 07:56

## 2024-04-03 RX ADMIN — HYDROCORTISONE SODIUM SUCCINATE 50 MG: 100 INJECTION, POWDER, FOR SOLUTION INTRAMUSCULAR; INTRAVENOUS at 07:57

## 2024-04-03 RX ADMIN — SODIUM CHLORIDE, PRESERVATIVE FREE 10 ML: 5 INJECTION INTRAVENOUS at 07:59

## 2024-04-03 RX ADMIN — CALCIUM 1000 MG: 500 TABLET ORAL at 21:15

## 2024-04-03 RX ADMIN — WATER 2000 MG: 1 INJECTION INTRAMUSCULAR; INTRAVENOUS; SUBCUTANEOUS at 21:16

## 2024-04-03 RX ADMIN — HYDROCORTISONE SODIUM SUCCINATE 50 MG: 100 INJECTION, POWDER, FOR SOLUTION INTRAMUSCULAR; INTRAVENOUS at 21:15

## 2024-04-03 RX ADMIN — VALACYCLOVIR HYDROCHLORIDE 500 MG: 500 TABLET, FILM COATED ORAL at 07:56

## 2024-04-03 ASSESSMENT — PAIN SCALES - GENERAL
PAINLEVEL_OUTOF10: 0
PAINLEVEL_OUTOF10: 6
PAINLEVEL_OUTOF10: 0

## 2024-04-03 ASSESSMENT — PAIN DESCRIPTION - DESCRIPTORS: DESCRIPTORS: ACHING

## 2024-04-03 ASSESSMENT — PAIN DESCRIPTION - LOCATION: LOCATION: HEAD

## 2024-04-03 ASSESSMENT — PAIN DESCRIPTION - ORIENTATION: ORIENTATION: POSTERIOR;ANTERIOR

## 2024-04-03 ASSESSMENT — PAIN - FUNCTIONAL ASSESSMENT: PAIN_FUNCTIONAL_ASSESSMENT: PREVENTS OR INTERFERES SOME ACTIVE ACTIVITIES AND ADLS

## 2024-04-03 NOTE — PROGRESS NOTES
met with patient and explored health, thoughts, and emotions. Patient shared about health and family support.  offered blessing and patient expressed appreciation.

## 2024-04-03 NOTE — PROGRESS NOTES
Report called to 6S RN. Placed on telemonitor 629, CMR called. Belongings consist of: pants, coat, underwear, shoes, shirt, purse, cell phone with , iPad with , pillow, suitcase, and glasses on patient.

## 2024-04-03 NOTE — PATIENT CARE CONFERENCE
Intensive Care Daily Quality Rounding Checklist        ICU Team Members: Bedside RN, TL      ICU Day #: Intermediate Status as of 4/2     Intubation Date: N/A     Ventilator Day #: N/A     Central Line Insertion Date:  N/A                                                    Day #:                                                     Indication:       Arterial Line Insertion Date:  N/A                             Day #: N/A     Temporary Hemodialysis Catheter Insertion Date:  N/A                             Day # N/A     DVT Prophylaxis: lovenox    GI Prophylaxis: protonix     [place] Catheter Insertion Date: N/A                                          Day #:                              Indications:                              Continued need (if yes, reason documented and discussed with physician):      Skin Issues/ Wounds and ordered treatment discussed on rounds: No     Goals/ Plans for the Day: Intermediate, continue current management     Reviewed plan and goals for day with patient and/or representative: Yes, alert

## 2024-04-03 NOTE — CARE COORDINATION
Transition of Care-Transfer to [facility] once bed becomes available. IV steroids to Q12 Rocephin continues. Patient successfully weaned off levo. She stated she is independent from home, resides with her sister. PT/OT ordered to assist in discharge planning.    Adenike FRASER, RN  [facility]

## 2024-04-03 NOTE — PROGRESS NOTES
Infectious Disease  Progress Note  [facility]    Chief Complaint: bacteremia    Subjective:  she is still in ICU. Sitting in chair. No fever overnight.  She developed cold sores on her upper lip.  She received Valtrex 2 doses overnight.    Scheduled Meds:   hydrocortisone sodium succinate PF  50 mg IntraVENous BID    metoprolol succinate  25 mg Oral Daily    vitamin D  50,000 Units Oral Weekly    calcium elemental  1,000 mg Oral BID    magnesium oxide  400 mg Oral Daily with breakfast    cefTRIAXone (ROCEPHIN) IV  2,000 mg IntraVENous Q24H    famotidine  20 mg Oral Daily    sodium chloride flush  5-40 mL IntraVENous 2 times per day    enoxaparin  40 mg SubCUTAneous Daily     Continuous Infusions:   sodium chloride       PRN Meds:medicated lip balm, sodium chloride flush, sodium chloride flush, sodium chloride, ondansetron **OR** ondansetron, polyethylene glycol, acetaminophen **OR** acetaminophen    Prior to Admission medications    Medication Sig Start Date End Date Taking? Authorizing Provider   ferrous fumarate-vitamin c (YARELI-SEQUELS) 65-25 MG TBCR CR tablet Take 1 tablet by mouth daily (with breakfast) Takes 29 mg daily    Sean Dee MD   Cholecalciferol (VITAMIN D3) 1.25 MG (85465 UT) CAPS Take 1 capsule by mouth Daily    Sean Dee MD   hydroCHLOROthiazide (MICROZIDE) 12.5 MG capsule Take 1 capsule by mouth daily    Sean Dee MD   calcium carbonate (OSCAL) 500 MG TABS tablet Take 1 tablet by mouth 2 times daily    Sean Dee MD   Multiple Vitamins-Minerals (THERAPEUTIC MULTIVITAMIN-MINERALS) tablet Take 1 tablet by mouth 2 times daily    Sean Dee MD   Ascorbic Acid (VITAMIN C) 250 MG tablet Take 4 tablets by mouth daily    Sean Dee MD   vitamin B-12 (CYANOCOBALAMIN) 100 MCG tablet Take 10 tablets by mouth three times a week Monday, Wednesday, Friday    Sean Dee MD   magnesium oxide (MAG-OX) 400 MG tablet Take 250 mg by mouth  daily    Provider, MD Sean        ROS:  As mentioned in subjective, all other systems negative      BP (!) 134/93   Pulse (!) 49   Temp 97.5 °F (36.4 °C) (Axillary)   Resp 16   Ht 1.549 m (5' 0.98\")   Wt 88.5 kg (195 lb 1.7 oz)   SpO2 97%   BMI 36.88 kg/m²     Physical Exam  Constitutional: The patient is awake, alert, and oriented.   Skin: Warm and dry. No rashes were noted. No jaundice.  HEENT: Eyes show round, and reactive pupils. Moist mucous membranes, no ulcerations, no thrush.  HSV lesions on the upper lip  Neck: Supple to movements. No lymphadenopathy.   Chest: No use of accessory muscles to breathe. Symmetrical expansion. Auscultation reveals no wheezing, crackles, or rhonchi.   Cardiovascular: S1 and S2 are rhythmic and regular. No murmurs appreciated.   Abdomen: soft, left sided flank tenderness  Extremities: No clubbing, no cyanosis, no edema.  Musculoskeletal: no gross focal abnormalities  Neurological: awake alert oriented x 3  Lines: peripheral, right fem CVC    Labs, Cultures reviewed  Radiology and other consultants notes reviewed    Microbiology:  Blood cx: E.coli (pending susceptibilities)  Urine cx: E.coli    Assessment:  Septic shock: on levophed  E.coli bacteremia:   Complicated UTI: left sided obstructed urolithiasis s/p stent placement:   Leucocytosis:  Herpes labialis:     Plan:    Cont IV ceftriaxone 2gr daily  Valtrex was suppose to be 2gr BID for 2 doses but patient received 500mg two doses overnight. Will give 1500mg x 2 doses to give total 4 gr dose.  Monitor labs  Will follow with you      Electronically signed by RADHA WHELAN MD on 4/3/2024 at 10:59 AM

## 2024-04-03 NOTE — PROGRESS NOTES
ENDOCRINOLOGY PROGRESS  NOTE    Date of Admission: 3/31/2024  Date of Service: 4/3/2024  Admitting Physician: Rob Lott DO   Primary Care Physician: Rob Lott DO  Consultant physician: Jay Sánhcez MD     Reason for the consultation:  Hyperparathyroidism    History of Present Illness:  The history is provided by the patient. Accuracy of the patient data is excellent.  Mary Luu is a very pleasant 73 y.o. old female with PMH of HTN, obesity s/p gastric bypass surgery, vitD deficiency and other listed below admitted to [facility] on 3/31/2024 because of  sided flank pain, nausea, and chills and low BP, endocrine service was consulted for evaluation and management of hyperparathyroidism     Subjective   No acute events, on Ca and itD     Scheduled Meds:   [START ON 4/4/2024] lactobacillus  1 capsule Oral Daily with breakfast    valACYclovir  1,500 mg Oral BID    hydrocortisone sodium succinate PF  50 mg IntraVENous BID    metoprolol succinate  25 mg Oral Daily    vitamin D  50,000 Units Oral Weekly    calcium elemental  1,000 mg Oral BID    magnesium oxide  400 mg Oral Daily with breakfast    cefTRIAXone (ROCEPHIN) IV  2,000 mg IntraVENous Q24H    famotidine  20 mg Oral Daily    sodium chloride flush  5-40 mL IntraVENous 2 times per day    enoxaparin  40 mg SubCUTAneous Daily     PRN Meds:   medicated lip balm, , PRN  sodium chloride flush, 5-40 mL, PRN  sodium chloride flush, 5-40 mL, PRN  sodium chloride, , PRN  ondansetron, 4 mg, Q8H PRN   Or  ondansetron, 4 mg, Q6H PRN  polyethylene glycol, 17 g, Daily PRN  acetaminophen, 650 mg, Q6H PRN   Or  acetaminophen, 650 mg, Q6H PRN      Continuous Infusions:   sodium chloride         Review of Systems  All systems reviewed. All negative except for symptoms mentioned in HPI     OBJECTIVE    BP (!) 143/74   Pulse 54   Temp 97.5 °F (36.4 °C) (Axillary)   Resp 22   Ht 1.549 m (5' 0.98\")   Wt 88.5 kg (195 lb 1.7 oz)   SpO2 97%   BMI 36.88 kg/m²   Wt  Readings from Last 6 Encounters:   03/31/24 88.5 kg (195 lb 1.7 oz)   10/14/23 89.4 kg (197 lb)   04/08/21 88.5 kg (195 lb)   03/19/21 88.5 kg (195 lb)       Physical examination:  General: awake alert, oriented x3, no abnormal position or movements.   HEENT: normocephalic non traumatic  Neck: supple  Pulm: good equal air entry no added sounds   CVS: S1 + S2   Abd: soft lax, no tenderness,   Skin: warm, no lesions, no rash  Musculoskeletal: No back tenderness, no kyphosis/scoliosis    Neuro: CN intact, sensation notmal , muscle power normal  Psych: normal mood, and affect    Review of Laboratory Data:  I personally reviewed the following labs:  Recent Labs     03/31/24  1839 04/01/24  0034 04/01/24 0420 04/02/24  0512 04/03/24  0634   WBC 2.8* 23.9* 31.4* 29.9* 27.7*   RBC 5.24 4.07 4.01 3.62 3.98   HGB 15.1 12.2 12.1 10.8* 11.8   HCT 46.8 37.6 37.2 33.8* 36.9   MCV 89.3 92.4 92.8 93.4 92.7   MCH 28.8 30.0 30.2 29.8 29.6   MCHC 32.3 32.4 32.5 32.0 32.0   RDW 12.9 13.2 13.3 13.9 13.6    190 157  --   --    MPV 10.4 10.5 11.0 11.3 11.3     Recent Labs     04/01/24  0420 04/02/24  0512 04/03/24  0634    141 145   K 3.9 4.2 3.9    111* 112*   CO2 19* 24 24   BUN 27* 22 23   CREATININE 1.2* 0.8 0.7   GLUCOSE 121* 114* 105*   CALCIUM 8.1* 9.1 9.3   PROT 5.3* 6.2* 6.4   LABALBU 3.1* 3.9 3.6   BILITOT 0.3 0.4 0.4   ALKPHOS 72 65 114*   AST 52* 25 25   ALT 34* 23 21     No results found for: \"BHYDRXBUT\"  No results found for: \"LABA1C\"  Lab Results   Component Value Date/Time    TSH 2.670 03/19/2021 02:55 PM     Lab Results   Component Value Date/Time    GLUCOSE 105 04/03/2024 06:34 AM     Lab Results   Component Value Date/Time    TRIG 80 04/01/2024 04:20 AM    HDL 51 04/01/2024 04:20 AM    CHOL 112 04/01/2024 04:20 AM       Blood culture   No results found for: \"BC\"    Radiology:  FL RETROGRADE PYELOGRAM W WO KUB   Final Result   Intraprocedural fluoroscopic spot images as above.  See separate procedure

## 2024-04-03 NOTE — PROGRESS NOTES
Pulmonary/Critical Care to sign off at this time.  Please let us know if our services are needed any further.    Ang Hilton MD

## 2024-04-03 NOTE — PROGRESS NOTES
Off pressors. Vss including bp. Can taper steroid. Continue antibiotic for now . No fever. Appreciate endo input. Continue current care

## 2024-04-03 NOTE — PROGRESS NOTES
[facility]  Nephrology Attending Progress Note  Carrillo Diaz MD        SUBJECTIVE:     4/1/24: 72 yo female with a pmh of htn, gastric bypass,  who presented with L sided flank pain, nausea, and chills. She was hypotensive and started on levo after receiving 2.5 L of ns. She was started on ceftriaxone. Seh is now on ns at 100 ml/hr. Labs show na 135 k 3.9, co2 19 bun 27 cr 0.9>1.2, mg 2.2, lactate 2.2, ca 8.1, p 3.3, alb 3.1, bc 31.4, hgb 15>12.1, plt 157. Ct with dye was done which showed the 8 mm L UPJstone and mod hydro, and possible wall thickening versus underdistention of areas of cecum and ascending colon. Vitals show temp of 98.1, rr 21 hr 72 bp 104/60. Sbp was in the 70s. From 0ct 2023 cr was 0.7. outpt microzide is on hold. Pt is feeling better today, flank pain has improved. She is talking on her cell phone. No cp or sob, wasn't taking any nsaids as an outpt. She does not have a h/o stones. She was on outpt ca due to her gastric bypass.     4/2/24 :pt seen and examined in icu, flank pain on L much better. No n/v/d, sitting in chair, starting to eat    4/3/24: pt seen and examined, feels  better, flank pain improved     PROBLEM LIST:    Patient Active Problem List   Diagnosis    Diverticulitis    Diverticulitis of colon    Septic shock (HCC)        PAST MEDICAL HISTORY:    Past Medical History:   Diagnosis Date    Headache     Hypertension        DIET:    ADULT DIET; Regular     PHYSICAL EXAM:     Patient Vitals for the past 24 hrs:   BP Temp Temp src Pulse Resp SpO2   04/03/24 0900 -- -- -- (!) 49 16 --   04/03/24 0800 (!) 134/93 97.5 °F (36.4 °C) Axillary 62 24 97 %   04/03/24 0756 109/62 -- -- 67 -- --   04/03/24 0400 109/62 -- -- 74 23 --   04/03/24 0000 118/68 98.3 °F (36.8 °C) Oral 56 19 --   04/02/24 2300 (!) 122/103 -- -- 68 22 --   04/02/24 2200 123/83 -- -- 67 28 --   04/02/24 2100 130/69 -- -- 69 28 --   04/02/24 2000 114/80 98.4 °F (36.9 °C) Oral 64 20 96 %   04/02/24 1800 113/67 --

## 2024-04-03 NOTE — PLAN OF CARE
Problem: Pain  Goal: Verbalizes/displays adequate comfort level or baseline comfort level  Outcome: Progressing  Flowsheets  Taken 4/3/2024 0000 by Melyssa Sinha RN  Verbalizes/displays adequate comfort level or baseline comfort level: Assess pain using appropriate pain scale  Taken 4/2/2024 2000 by Melyssa Sinha RN  Verbalizes/displays adequate comfort level or baseline comfort level: Assess pain using appropriate pain scale  Taken 4/2/2024 1600 by Becka Lincoln RN  Verbalizes/displays adequate comfort level or baseline comfort level: Assess pain using appropriate pain scale  Taken 4/2/2024 1200 by Becka Lincoln RN  Verbalizes/displays adequate comfort level or baseline comfort level: Assess pain using appropriate pain scale     Problem: Discharge Planning  Goal: Discharge to home or other facility with appropriate resources  Outcome: Progressing     Problem: ABCDS Injury Assessment  Goal: Absence of physical injury  Outcome: Progressing  Flowsheets (Taken 4/3/2024 0021)  Absence of Physical Injury: Implement safety measures based on patient assessment     Problem: Safety - Adult  Goal: Free from fall injury  Outcome: Progressing  Flowsheets (Taken 4/3/2024 0021)  Free From Fall Injury: Instruct family/caregiver on patient safety

## 2024-04-04 LAB
ALBUMIN SERPL-MCNC: 3.3 G/DL (ref 3.5–5.2)
ALP SERPL-CCNC: 76 U/L (ref 35–104)
ALT SERPL-CCNC: 20 U/L (ref 0–32)
ANION GAP SERPL CALCULATED.3IONS-SCNC: 8 MMOL/L (ref 7–16)
AST SERPL-CCNC: 25 U/L (ref 0–31)
BASOPHILS # BLD: 0.04 K/UL (ref 0–0.2)
BASOPHILS NFR BLD: 0 % (ref 0–2)
BILIRUB SERPL-MCNC: 0.3 MG/DL (ref 0–1.2)
BUN SERPL-MCNC: 25 MG/DL (ref 6–23)
CALCIUM SERPL-MCNC: 9.3 MG/DL (ref 8.6–10.2)
CHLORIDE SERPL-SCNC: 114 MMOL/L (ref 98–107)
CO2 SERPL-SCNC: 23 MMOL/L (ref 22–29)
CREAT SERPL-MCNC: 0.6 MG/DL (ref 0.5–1)
EOSINOPHIL # BLD: 0.03 K/UL (ref 0.05–0.5)
EOSINOPHILS RELATIVE PERCENT: 0 % (ref 0–6)
ERYTHROCYTE [DISTWIDTH] IN BLOOD BY AUTOMATED COUNT: 13.4 % (ref 11.5–15)
GFR SERPL CREATININE-BSD FRML MDRD: >90 ML/MIN/1.73M2
GLUCOSE SERPL-MCNC: 104 MG/DL (ref 74–99)
HCT VFR BLD AUTO: 35.5 % (ref 34–48)
HGB BLD-MCNC: 11.6 G/DL (ref 11.5–15.5)
IMM GRANULOCYTES # BLD AUTO: 0.17 K/UL (ref 0–0.58)
IMM GRANULOCYTES NFR BLD: 1 % (ref 0–5)
LACTATE BLDV-SCNC: 1.1 MMOL/L (ref 0.5–2.2)
LYMPHOCYTES NFR BLD: 2.33 K/UL (ref 1.5–4)
LYMPHOCYTES RELATIVE PERCENT: 13 % (ref 20–42)
MAGNESIUM SERPL-MCNC: 1.8 MG/DL (ref 1.6–2.6)
MCH RBC QN AUTO: 29.5 PG (ref 26–35)
MCHC RBC AUTO-ENTMCNC: 32.7 G/DL (ref 32–34.5)
MCV RBC AUTO: 90.3 FL (ref 80–99.9)
MONOCYTES NFR BLD: 0.83 K/UL (ref 0.1–0.95)
MONOCYTES NFR BLD: 5 % (ref 2–12)
NEUTROPHILS NFR BLD: 80 % (ref 43–80)
NEUTS SEG NFR BLD: 13.95 K/UL (ref 1.8–7.3)
PHOSPHATE SERPL-MCNC: 3.3 MG/DL (ref 2.5–4.5)
PLATELET # BLD AUTO: 170 K/UL (ref 130–450)
PMV BLD AUTO: 10.9 FL (ref 7–12)
POTASSIUM SERPL-SCNC: 3.9 MMOL/L (ref 3.5–5)
PROT SERPL-MCNC: 5.8 G/DL (ref 6.4–8.3)
RBC # BLD AUTO: 3.93 M/UL (ref 3.5–5.5)
SODIUM SERPL-SCNC: 145 MMOL/L (ref 132–146)
WBC OTHER # BLD: 17.4 K/UL (ref 4.5–11.5)

## 2024-04-04 PROCEDURE — 2580000003 HC RX 258: Performed by: STUDENT IN AN ORGANIZED HEALTH CARE EDUCATION/TRAINING PROGRAM

## 2024-04-04 PROCEDURE — 80053 COMPREHEN METABOLIC PANEL: CPT

## 2024-04-04 PROCEDURE — 6370000000 HC RX 637 (ALT 250 FOR IP): Performed by: INTERNAL MEDICINE

## 2024-04-04 PROCEDURE — 83735 ASSAY OF MAGNESIUM: CPT

## 2024-04-04 PROCEDURE — 99232 SBSQ HOSP IP/OBS MODERATE 35: CPT | Performed by: INTERNAL MEDICINE

## 2024-04-04 PROCEDURE — 2580000003 HC RX 258

## 2024-04-04 PROCEDURE — 85025 COMPLETE CBC W/AUTO DIFF WBC: CPT

## 2024-04-04 PROCEDURE — 6370000000 HC RX 637 (ALT 250 FOR IP): Performed by: GENERAL PRACTICE

## 2024-04-04 PROCEDURE — 6360000002 HC RX W HCPCS: Performed by: STUDENT IN AN ORGANIZED HEALTH CARE EDUCATION/TRAINING PROGRAM

## 2024-04-04 PROCEDURE — 83605 ASSAY OF LACTIC ACID: CPT

## 2024-04-04 PROCEDURE — 84100 ASSAY OF PHOSPHORUS: CPT

## 2024-04-04 PROCEDURE — 6370000000 HC RX 637 (ALT 250 FOR IP)

## 2024-04-04 PROCEDURE — 6370000000 HC RX 637 (ALT 250 FOR IP): Performed by: STUDENT IN AN ORGANIZED HEALTH CARE EDUCATION/TRAINING PROGRAM

## 2024-04-04 PROCEDURE — 36415 COLL VENOUS BLD VENIPUNCTURE: CPT

## 2024-04-04 PROCEDURE — 2060000000 HC ICU INTERMEDIATE R&B

## 2024-04-04 RX ORDER — ACYCLOVIR 50 MG/G
OINTMENT TOPICAL
Qty: 1 EACH | Refills: 1 | Status: SHIPPED | OUTPATIENT
Start: 2024-04-04 | End: 2024-04-05

## 2024-04-04 RX ORDER — CEFDINIR 300 MG/1
300 CAPSULE ORAL 2 TIMES DAILY
Qty: 18 CAPSULE | Refills: 0 | Status: SHIPPED | OUTPATIENT
Start: 2024-04-04 | End: 2024-04-05

## 2024-04-04 RX ORDER — METOPROLOL SUCCINATE 25 MG/1
12.5 TABLET, EXTENDED RELEASE ORAL DAILY
Status: DISCONTINUED | OUTPATIENT
Start: 2024-04-04 | End: 2024-04-05 | Stop reason: HOSPADM

## 2024-04-04 RX ORDER — CHOLECALCIFEROL (VITAMIN D3) 50 MCG
1000 TABLET ORAL DAILY
Status: DISCONTINUED | OUTPATIENT
Start: 2024-04-05 | End: 2024-04-05 | Stop reason: HOSPADM

## 2024-04-04 RX ORDER — HYDROCHLOROTHIAZIDE 12.5 MG/1
12.5 TABLET ORAL DAILY
Status: DISCONTINUED | OUTPATIENT
Start: 2024-04-04 | End: 2024-04-05 | Stop reason: HOSPADM

## 2024-04-04 RX ADMIN — Medication 1 CAPSULE: at 09:08

## 2024-04-04 RX ADMIN — HYDROCHLOROTHIAZIDE 12.5 MG: 12.5 TABLET ORAL at 09:31

## 2024-04-04 RX ADMIN — FAMOTIDINE 20 MG: 20 TABLET ORAL at 09:08

## 2024-04-04 RX ADMIN — WATER 2000 MG: 1 INJECTION INTRAMUSCULAR; INTRAVENOUS; SUBCUTANEOUS at 22:18

## 2024-04-04 RX ADMIN — MAGNESIUM OXIDE 400 MG (241.3 MG MAGNESIUM) TABLET 400 MG: TABLET at 09:08

## 2024-04-04 RX ADMIN — METOPROLOL SUCCINATE 12.5 MG: 25 TABLET, FILM COATED, EXTENDED RELEASE ORAL at 09:31

## 2024-04-04 RX ADMIN — SODIUM CHLORIDE, PRESERVATIVE FREE 10 ML: 5 INJECTION INTRAVENOUS at 22:19

## 2024-04-04 RX ADMIN — SODIUM CHLORIDE, PRESERVATIVE FREE 10 ML: 5 INJECTION INTRAVENOUS at 09:10

## 2024-04-04 RX ADMIN — ACETAMINOPHEN 650 MG: 325 TABLET ORAL at 22:19

## 2024-04-04 RX ADMIN — CALCIUM 1000 MG: 500 TABLET ORAL at 09:08

## 2024-04-04 ASSESSMENT — PAIN DESCRIPTION - LOCATION: LOCATION: HEAD

## 2024-04-04 ASSESSMENT — PAIN SCALES - GENERAL
PAINLEVEL_OUTOF10: 0
PAINLEVEL_OUTOF10: 5

## 2024-04-04 ASSESSMENT — PAIN DESCRIPTION - DESCRIPTORS: DESCRIPTORS: ACHING

## 2024-04-04 NOTE — PROGRESS NOTES
Infectious Disease  Progress Note  [facility]    Chief Complaint: bacteremia    Subjective:  she is transferred out of the ICU. No fever. Lesions on the mouth is getting better. Asking for cream for the lesions outpatient.    Scheduled Meds:   hydroCHLOROthiazide  12.5 mg Oral Daily    metoprolol succinate  12.5 mg Oral Daily    lactobacillus  1 capsule Oral Daily with breakfast    vitamin D  50,000 Units Oral Weekly    calcium elemental  1,000 mg Oral BID    magnesium oxide  400 mg Oral Daily with breakfast    cefTRIAXone (ROCEPHIN) IV  2,000 mg IntraVENous Q24H    famotidine  20 mg Oral Daily    sodium chloride flush  5-40 mL IntraVENous 2 times per day    enoxaparin  40 mg SubCUTAneous Daily     Continuous Infusions:   sodium chloride       PRN Meds:medicated lip balm, sodium chloride flush, sodium chloride flush, sodium chloride, ondansetron **OR** ondansetron, polyethylene glycol, acetaminophen **OR** acetaminophen    Prior to Admission medications    Medication Sig Start Date End Date Taking? Authorizing Provider   ferrous fumarate-vitamin c (YARELI-SEQUELS) 65-25 MG TBCR CR tablet Take 1 tablet by mouth daily (with breakfast) Takes 29 mg daily    Sean Dee MD   Cholecalciferol (VITAMIN D3) 1.25 MG (55766 UT) CAPS Take 1 capsule by mouth Daily    Sean Dee MD   hydroCHLOROthiazide (MICROZIDE) 12.5 MG capsule Take 1 capsule by mouth daily    Sean Dee MD   calcium carbonate (OSCAL) 500 MG TABS tablet Take 1 tablet by mouth 2 times daily    Sean Dee MD   Multiple Vitamins-Minerals (THERAPEUTIC MULTIVITAMIN-MINERALS) tablet Take 1 tablet by mouth 2 times daily    Sean Dee MD   Ascorbic Acid (VITAMIN C) 250 MG tablet Take 4 tablets by mouth daily    Sean Dee MD   vitamin B-12 (CYANOCOBALAMIN) 100 MCG tablet Take 10 tablets by mouth three times a week Monday, Wednesday, Friday    Sean Dee MD   magnesium oxide (MAG-OX) 400 MG tablet

## 2024-04-04 NOTE — PROGRESS NOTES
[facility] Quality Flow/Interdisciplinary Rounds Progress Note        Quality Flow Rounds held on April 4, 2024    Disciplines Attending:  Bedside Nurse, , , and Nursing Unit Leadership    Mary Luu was admitted on 3/31/2024  5:39 PM    Anticipated Discharge Date:       Disposition:    Tera Score:  Tera Scale Score: 20    Readmission Risk              Risk of Unplanned Readmission:  14           Discussed patient goal for the day, patient clinical progression, and barriers to discharge.  The following Goal(s) of the Day/Commitment(s) have been identified:   continue IV abx,       Yu Higuera, SHANNON  April 4, 2024

## 2024-04-04 NOTE — PROGRESS NOTES
[facility]  Nephrology Attending Progress Note  Carrillo Diaz MD        SUBJECTIVE:     4/1/24: 72 yo female with a pmh of htn, gastric bypass,  who presented with L sided flank pain, nausea, and chills. She was hypotensive and started on levo after receiving 2.5 L of ns. She was started on ceftriaxone. Seh is now on ns at 100 ml/hr. Labs show na 135 k 3.9, co2 19 bun 27 cr 0.9>1.2, mg 2.2, lactate 2.2, ca 8.1, p 3.3, alb 3.1, bc 31.4, hgb 15>12.1, plt 157. Ct with dye was done which showed the 8 mm L UPJstone and mod hydro, and possible wall thickening versus underdistention of areas of cecum and ascending colon. Vitals show temp of 98.1, rr 21 hr 72 bp 104/60. Sbp was in the 70s. From 0ct 2023 cr was 0.7. outpt microzide is on hold. Pt is feeling better today, flank pain has improved. She is talking on her cell phone. No cp or sob, wasn't taking any nsaids as an outpt. She does not have a h/o stones. She was on outpt ca due to her gastric bypass.     4/2/24 :pt seen and examined in icu, flank pain on L much better. No n/v/d, sitting in chair, starting to eat    4/3/24: pt seen and examined, feels  better, flank pain improved    4/4/24 :pt transferred out of icu, feels better but still weak, appetite improving, no cp or sob     PROBLEM LIST:    Patient Active Problem List   Diagnosis    Diverticulitis    Diverticulitis of colon    Septic shock (HCC)        PAST MEDICAL HISTORY:    Past Medical History:   Diagnosis Date    Headache     Hypertension        DIET:    ADULT DIET; Regular     PHYSICAL EXAM:     Patient Vitals for the past 24 hrs:   BP Temp Temp src Pulse Resp SpO2   04/04/24 1131 132/75 97.6 °F (36.4 °C) Axillary 58 18 --   04/04/24 0903 (!) 174/78 98.3 °F (36.8 °C) Oral 57 16 96 %   04/04/24 0230 -- -- Oral -- -- --   04/04/24 0200 (!) 143/66 98.1 °F (36.7 °C) -- 60 18 95 %   04/03/24 2330 129/69 98.2 °F (36.8 °C) -- 67 20 93 %   04/03/24 2015 132/71 97.7 °F (36.5 °C) Axillary 62 20 95 %

## 2024-04-04 NOTE — PROGRESS NOTES
Patient seen wbc trending down. Herpes improved now that dose ws corrected. Bp a little up resume bp meds. Continue with ab for uti

## 2024-04-04 NOTE — PROGRESS NOTES
OCCUPATIONAL THERAPY   [facility]    Date:2024  Patient Name: Mary Luu  MRN: [id number]  : 1950  ROOM #: [id number]    OT orders received, chart reviewed, and screen completed in room. Pt up in bathroom independently upon arrival and reports no questions/concerns from an OT standpoint at this time. Skilled OT services not warranted. OT will discontinue orders at this time. Please reorder OT if there is a change in patient's physical/medical status.     Thank you,    Mechelle Baeza OTR/L #[id number]

## 2024-04-04 NOTE — PLAN OF CARE
Problem: Pain  Goal: Verbalizes/displays adequate comfort level or baseline comfort level  4/4/2024 0229 by Joel Kelley, RN  Outcome: Progressing  4/3/2024 1730 by Aysha Cortez, RN  Outcome: Progressing

## 2024-04-04 NOTE — FLOWSHEET NOTE
04/04/24 0200   Vital Signs   Temp 98.1 °F (36.7 °C)   Pulse 60   Respirations 18   BP (!) 143/66   MAP (Calculated) 92   Oxygen Therapy   SpO2 95 %     Notified by CMR HR dropped to 34, went to patient room immediately, vitals as above, pt. Advised she has a history of dipping in her sleep.  Denied symptoms.

## 2024-04-04 NOTE — PROGRESS NOTES
Dr. Lott called re: patient HR less than 60 and patient wanting to take her metoprolol even though it is below parameters. Also patient hypertensive and is wondering to have hydrochlorothiazide restarted. See new orders.

## 2024-04-04 NOTE — PROGRESS NOTES
4 Eyes Skin Assessment     NAME:  Mary Luu  YOB: 1950  MEDICAL RECORD NUMBER:  [id number]    The patient is being assessed for  Admission    I agree that at least one RN has performed a thorough Head to Toe Skin Assessment on the patient. ALL assessment sites listed below have been assessed.      Areas assessed by both nurses:    Head, Face, Ears, Shoulders, Back, Chest, Arms, Elbows, Hands, Sacrum. Buttock, Coccyx, Ischium, Legs. Feet and Heels, and Under Medical Devices         Does the Patient have a Wound? No noted wound(s)       Tera Prevention initiated by RN: No  Wound Care Orders initiated by RN: No    Pressure Injury (Stage 3,4, Unstageable, DTI, NWPT, and Complex wounds) if present, place Wound referral order by RN under : No    New Ostomies, if present place, Ostomy referral order under : Yes     Nurse 1 eSignature: Electronically signed by Joel Kelley RN on 4/4/24 at 3:45 AM EDT    **SHARE this note so that the co-signing nurse can place an eSignature**    Nurse 2 eSignature: Electronically signed by Neto Jack RN on 4/4/24 at 3:47 AM EDT

## 2024-04-04 NOTE — CARE COORDINATION
Social Work / Discharge Planning : Patient is a transfer from ICU. Patient independent from HOME with her sister and will return when stable for D/C. ID on board and currently on IV antibiotics. Endocrine and Urology following as well. Patient PCP is DR Lott and has insurance. Await treatment plan. Family can transport at discharge. SW to follow. Electronically signed by TAYLER Sidhu on 4/4/24 at 9:52 AM EDT

## 2024-04-04 NOTE — PROGRESS NOTES
P Quality Flow/Interdisciplinary Rounds Progress Note        Quality Flow Rounds held on April 4, 2024    Disciplines Attending:  Bedside Nurse, , , and Nursing Unit Leadership    Mary Luu was admitted on 3/31/2024  5:39 PM    Anticipated Discharge Date:   tbd    Disposition: home    Tera Score:  Tera Scale Score: 20    Readmission Risk              Risk of Unplanned Readmission:  14           Discussed patient goal for the day, patient clinical progression, and barriers to discharge.  The following Goal(s) of the Day/Commitment(s) have been identified:   continue Abx, ID plan, nephro plan, endocrine plan       Yu Higuera RN  April 4, 2024

## 2024-04-05 VITALS
DIASTOLIC BLOOD PRESSURE: 70 MMHG | WEIGHT: 195.11 LBS | HEART RATE: 72 BPM | SYSTOLIC BLOOD PRESSURE: 130 MMHG | OXYGEN SATURATION: 97 % | BODY MASS INDEX: 36.84 KG/M2 | TEMPERATURE: 97.4 F | HEIGHT: 61 IN | RESPIRATION RATE: 16 BRPM

## 2024-04-05 LAB
25(OH)D3 SERPL-MCNC: 34.5 NG/ML (ref 30–100)
ALBUMIN SERPL-MCNC: 3.3 G/DL (ref 3.5–5.2)
ALP SERPL-CCNC: 84 U/L (ref 35–104)
ALT SERPL-CCNC: 23 U/L (ref 0–32)
ANION GAP SERPL CALCULATED.3IONS-SCNC: 8 MMOL/L (ref 7–16)
AST SERPL-CCNC: 29 U/L (ref 0–31)
BASOPHILS # BLD: 0.09 K/UL (ref 0–0.2)
BASOPHILS NFR BLD: 1 % (ref 0–2)
BILIRUB SERPL-MCNC: 0.3 MG/DL (ref 0–1.2)
BUN SERPL-MCNC: 20 MG/DL (ref 6–23)
CALCIUM SERPL-MCNC: 9.2 MG/DL (ref 8.6–10.2)
CHLORIDE SERPL-SCNC: 109 MMOL/L (ref 98–107)
CO2 SERPL-SCNC: 24 MMOL/L (ref 22–29)
CREAT SERPL-MCNC: 0.7 MG/DL (ref 0.5–1)
EOSINOPHIL # BLD: 0.38 K/UL (ref 0.05–0.5)
EOSINOPHILS RELATIVE PERCENT: 4 % (ref 0–6)
ERYTHROCYTE [DISTWIDTH] IN BLOOD BY AUTOMATED COUNT: 13.3 % (ref 11.5–15)
GFR SERPL CREATININE-BSD FRML MDRD: >90 ML/MIN/1.73M2
GLUCOSE SERPL-MCNC: 85 MG/DL (ref 74–99)
HCT VFR BLD AUTO: 34.5 % (ref 34–48)
HGB BLD-MCNC: 11.3 G/DL (ref 11.5–15.5)
LYMPHOCYTES NFR BLD: 2.35 K/UL (ref 1.5–4)
LYMPHOCYTES RELATIVE PERCENT: 22 % (ref 20–42)
MAGNESIUM SERPL-MCNC: 1.7 MG/DL (ref 1.6–2.6)
MCH RBC QN AUTO: 29.3 PG (ref 26–35)
MCHC RBC AUTO-ENTMCNC: 32.8 G/DL (ref 32–34.5)
MCV RBC AUTO: 89.4 FL (ref 80–99.9)
MONOCYTES NFR BLD: 0.94 K/UL (ref 0.1–0.95)
MONOCYTES NFR BLD: 9 % (ref 2–12)
MYELOCYTES ABSOLUTE COUNT: 0.28 K/UL
MYELOCYTES: 3 %
NEUTROPHILS NFR BLD: 62 % (ref 43–80)
NEUTS SEG NFR BLD: 6.57 K/UL (ref 1.8–7.3)
PHOSPHATE SERPL-MCNC: 3.6 MG/DL (ref 2.5–4.5)
PLATELET # BLD AUTO: 169 K/UL (ref 130–450)
PMV BLD AUTO: 11 FL (ref 7–12)
POTASSIUM SERPL-SCNC: 3.8 MMOL/L (ref 3.5–5)
PROT SERPL-MCNC: 5.6 G/DL (ref 6.4–8.3)
PTH-INTACT SERPL-MCNC: 38.7 PG/ML (ref 15–65)
RBC # BLD AUTO: 3.86 M/UL (ref 3.5–5.5)
RBC # BLD: ABNORMAL 10*6/UL
RBC # BLD: ABNORMAL 10*6/UL
SODIUM SERPL-SCNC: 141 MMOL/L (ref 132–146)
WBC OTHER # BLD: 10.6 K/UL (ref 4.5–11.5)

## 2024-04-05 PROCEDURE — 6370000000 HC RX 637 (ALT 250 FOR IP): Performed by: INTERNAL MEDICINE

## 2024-04-05 PROCEDURE — 84100 ASSAY OF PHOSPHORUS: CPT

## 2024-04-05 PROCEDURE — 2580000003 HC RX 258

## 2024-04-05 PROCEDURE — 6370000000 HC RX 637 (ALT 250 FOR IP)

## 2024-04-05 PROCEDURE — 83735 ASSAY OF MAGNESIUM: CPT

## 2024-04-05 PROCEDURE — 80053 COMPREHEN METABOLIC PANEL: CPT

## 2024-04-05 PROCEDURE — 83970 ASSAY OF PARATHORMONE: CPT

## 2024-04-05 PROCEDURE — 82306 VITAMIN D 25 HYDROXY: CPT

## 2024-04-05 PROCEDURE — 85025 COMPLETE CBC W/AUTO DIFF WBC: CPT

## 2024-04-05 PROCEDURE — 6370000000 HC RX 637 (ALT 250 FOR IP): Performed by: STUDENT IN AN ORGANIZED HEALTH CARE EDUCATION/TRAINING PROGRAM

## 2024-04-05 PROCEDURE — 6370000000 HC RX 637 (ALT 250 FOR IP): Performed by: GENERAL PRACTICE

## 2024-04-05 RX ORDER — CHOLECALCIFEROL (VITAMIN D3) 50 MCG
1000 TABLET ORAL DAILY
Qty: 30 TABLET | Refills: 3 | Status: SHIPPED | OUTPATIENT
Start: 2024-04-06 | End: 2024-04-05

## 2024-04-05 RX ORDER — CEFDINIR 300 MG/1
300 CAPSULE ORAL 2 TIMES DAILY
Qty: 18 CAPSULE | Refills: 0 | Status: SHIPPED | OUTPATIENT
Start: 2024-04-05 | End: 2024-04-14

## 2024-04-05 RX ORDER — LACTOBACILLUS RHAMNOSUS GG 10B CELL
1 CAPSULE ORAL
Qty: 30 CAPSULE | Refills: 0 | Status: SHIPPED | OUTPATIENT
Start: 2024-04-06 | End: 2024-04-05

## 2024-04-05 RX ORDER — LACTOBACILLUS RHAMNOSUS GG 10B CELL
1 CAPSULE ORAL
Qty: 30 CAPSULE | Refills: 0 | Status: SHIPPED | OUTPATIENT
Start: 2024-04-06

## 2024-04-05 RX ORDER — CHOLECALCIFEROL (VITAMIN D3) 50 MCG
1000 TABLET ORAL DAILY
Qty: 30 TABLET | Refills: 3 | Status: SHIPPED | OUTPATIENT
Start: 2024-04-06

## 2024-04-05 RX ORDER — METOPROLOL SUCCINATE 25 MG/1
12.5 TABLET, EXTENDED RELEASE ORAL DAILY
Qty: 30 TABLET | Refills: 3 | Status: SHIPPED | OUTPATIENT
Start: 2024-04-06

## 2024-04-05 RX ORDER — ACYCLOVIR 50 MG/G
OINTMENT TOPICAL
Qty: 1 EACH | Refills: 1 | Status: SHIPPED | OUTPATIENT
Start: 2024-04-05 | End: 2024-04-12

## 2024-04-05 RX ORDER — METOPROLOL SUCCINATE 25 MG/1
12.5 TABLET, EXTENDED RELEASE ORAL DAILY
Qty: 30 TABLET | Refills: 3 | Status: SHIPPED | OUTPATIENT
Start: 2024-04-06 | End: 2024-04-05

## 2024-04-05 RX ADMIN — METOPROLOL SUCCINATE 12.5 MG: 25 TABLET, FILM COATED, EXTENDED RELEASE ORAL at 08:26

## 2024-04-05 RX ADMIN — ACETAMINOPHEN 650 MG: 325 TABLET ORAL at 08:26

## 2024-04-05 RX ADMIN — Medication 1000 UNITS: at 08:26

## 2024-04-05 RX ADMIN — HYDROCHLOROTHIAZIDE 12.5 MG: 12.5 TABLET ORAL at 08:26

## 2024-04-05 RX ADMIN — SODIUM CHLORIDE, PRESERVATIVE FREE 10 ML: 5 INJECTION INTRAVENOUS at 08:27

## 2024-04-05 RX ADMIN — FAMOTIDINE 20 MG: 20 TABLET ORAL at 08:27

## 2024-04-05 RX ADMIN — MAGNESIUM OXIDE 400 MG (241.3 MG MAGNESIUM) TABLET 400 MG: TABLET at 08:26

## 2024-04-05 RX ADMIN — Medication 1 CAPSULE: at 08:26

## 2024-04-05 NOTE — PROGRESS NOTES
ENDOCRINOLOGY PROGRESS  NOTE    Date of Admission: 3/31/2024  Date of Service: 4/4/2024  Admitting Physician: Rob Lott DO   Primary Care Physician: Rob Lott DO  Consultant physician: Jay Sánchez MD     Reason for the consultation:  Hyperparathyroidism    History of Present Illness:  The history is provided by the patient. Accuracy of the patient data is excellent.  Mary Luu is a very pleasant 73 y.o. old female with PMH of HTN, obesity s/p gastric bypass surgery, vitD deficiency and other listed below admitted to [facility] on 3/31/2024 because of  sided flank pain, nausea, and chills and low BP, endocrine service was consulted for evaluation and management of hyperparathyroidism     Subjective   No acute events, on Ca and itD     Scheduled Meds:   hydroCHLOROthiazide  12.5 mg Oral Daily    metoprolol succinate  12.5 mg Oral Daily    lactobacillus  1 capsule Oral Daily with breakfast    vitamin D  50,000 Units Oral Weekly    calcium elemental  1,000 mg Oral BID    magnesium oxide  400 mg Oral Daily with breakfast    cefTRIAXone (ROCEPHIN) IV  2,000 mg IntraVENous Q24H    famotidine  20 mg Oral Daily    sodium chloride flush  5-40 mL IntraVENous 2 times per day    enoxaparin  40 mg SubCUTAneous Daily     PRN Meds:   medicated lip balm, , PRN  sodium chloride flush, 5-40 mL, PRN  sodium chloride flush, 5-40 mL, PRN  sodium chloride, , PRN  ondansetron, 4 mg, Q8H PRN   Or  ondansetron, 4 mg, Q6H PRN  polyethylene glycol, 17 g, Daily PRN  acetaminophen, 650 mg, Q6H PRN   Or  acetaminophen, 650 mg, Q6H PRN      Continuous Infusions:   sodium chloride         Review of Systems  All systems reviewed. All negative except for symptoms mentioned in HPI     OBJECTIVE    BP (!) 142/60   Pulse 66   Temp 98.8 °F (37.1 °C) (Axillary)   Resp 16   Ht 1.549 m (5' 0.98\")   Wt 88.5 kg (195 lb 1.7 oz)   SpO2 95%   BMI 36.88 kg/m²   Wt Readings from Last 6 Encounters:   03/31/24 88.5 kg (195 lb 1.7 oz)  [facility]   [address] [place] [id number]   Phone: [phone]  Fax: [phone]  ---------------------------------  An electronic signature was used to authenticate this note. Jay Sánchez MD on 4/4/2024 at 8:27 PM

## 2024-04-05 NOTE — CARE COORDINATION
Social Work / Discharge Planning :SW followed up with patient and explained role. Patient verified plan at discharge is HOME where she resides independently;. Patient denies any HOME needs. ID planning to switch her IV antibiotics to orals at d/c. Await plan. Patient has transportation HOME. Patient on RA. SW to follow. Electronically signed by TAYLER Sidhu on 4/5/24 at 10:33 AM EDT

## 2024-04-05 NOTE — PROGRESS NOTES
Physical Therapy    PT order received and medical chart reviewed 4/5. Pt is up and ambulating around room without difficulty. Plan is for discharge home today. Discharge order noted. No PT needs at this time. Thank you.    Radhames Morejon, PT, DPT  [id number]

## 2024-04-05 NOTE — PROGRESS NOTES
ENDOCRINOLOGY PROGRESS  NOTE    Date of Admission: 3/31/2024  Date of Service: 4/5/2024  Admitting Physician: Rob Lott DO   Primary Care Physician: Rob Lott DO  Consultant physician: Jay Sánchez MD     Reason for the consultation:  Hyperparathyroidism    History of Present Illness:  The history is provided by the patient. Accuracy of the patient data is excellent.  Mary Luu is a very pleasant 73 y.o. old female with PMH of HTN, obesity s/p gastric bypass surgery, vitD deficiency and other listed below admitted to [facility] on 3/31/2024 because of  sided flank pain, nausea, and chills and low BP, endocrine service was consulted for evaluation and management of hyperparathyroidism     Subjective   ***    Scheduled Meds:   hydroCHLOROthiazide  12.5 mg Oral Daily    metoprolol succinate  12.5 mg Oral Daily    vitamin D  1,000 Units Oral Daily    lactobacillus  1 capsule Oral Daily with breakfast    magnesium oxide  400 mg Oral Daily with breakfast    cefTRIAXone (ROCEPHIN) IV  2,000 mg IntraVENous Q24H    famotidine  20 mg Oral Daily    sodium chloride flush  5-40 mL IntraVENous 2 times per day    enoxaparin  40 mg SubCUTAneous Daily     PRN Meds:   medicated lip balm, , PRN  sodium chloride flush, 5-40 mL, PRN  sodium chloride flush, 5-40 mL, PRN  sodium chloride, , PRN  ondansetron, 4 mg, Q8H PRN   Or  ondansetron, 4 mg, Q6H PRN  polyethylene glycol, 17 g, Daily PRN  acetaminophen, 650 mg, Q6H PRN   Or  acetaminophen, 650 mg, Q6H PRN      Continuous Infusions:   sodium chloride         Review of Systems  All systems reviewed. All negative except for symptoms mentioned in HPI     OBJECTIVE    BP (!) 164/75   Pulse 60   Temp 97.6 °F (36.4 °C) (Axillary)   Resp 16   Ht 1.549 m (5' 0.98\")   Wt 88.5 kg (195 lb 1.7 oz)   SpO2 95%   BMI 36.88 kg/m²   Wt Readings from Last 6 Encounters:   03/31/24 88.5 kg (195 lb 1.7 oz)   10/14/23 89.4 kg (197 lb)   04/08/21 88.5 kg (195 lb)   03/19/21 88.5 kg  Impulsivity Impulsivity/Severe anxiety, agitation or panic

## 2024-04-05 NOTE — PROGRESS NOTES
Discharge instructions and education provided to patient. IV removed, telepack returned to closet. No further questions or concerns at this time.

## 2024-04-05 NOTE — PLAN OF CARE
Problem: Pain  Goal: Verbalizes/displays adequate comfort level or baseline comfort level  4/4/2024 2135 by Pascale Ramirez, RN  Outcome: Progressing  4/4/2024 1024 by Ambreen Bradley RN  Outcome: Progressing     Problem: Discharge Planning  Goal: Discharge to home or other facility with appropriate resources  Outcome: Progressing     Problem: ABCDS Injury Assessment  Goal: Absence of physical injury  Outcome: Progressing     Problem: Safety - Adult  Goal: Free from fall injury  Outcome: Progressing

## 2024-04-05 NOTE — PROGRESS NOTES
[facility]  Nephrology Attending Progress Note  Carrillo Diaz MD        SUBJECTIVE:     4/1/24: 74 yo female with a pmh of htn, gastric bypass,  who presented with L sided flank pain, nausea, and chills. She was hypotensive and started on levo after receiving 2.5 L of ns. She was started on ceftriaxone. Seh is now on ns at 100 ml/hr. Labs show na 135 k 3.9, co2 19 bun 27 cr 0.9>1.2, mg 2.2, lactate 2.2, ca 8.1, p 3.3, alb 3.1, bc 31.4, hgb 15>12.1, plt 157. Ct with dye was done which showed the 8 mm L UPJstone and mod hydro, and possible wall thickening versus underdistention of areas of cecum and ascending colon. Vitals show temp of 98.1, rr 21 hr 72 bp 104/60. Sbp was in the 70s. From 0ct 2023 cr was 0.7. outpt microzide is on hold. Pt is feeling better today, flank pain has improved. She is talking on her cell phone. No cp or sob, wasn't taking any nsaids as an outpt. She does not have a h/o stones. She was on outpt ca due to her gastric bypass.     4/2/24 :pt seen and examined in icu, flank pain on L much better. No n/v/d, sitting in chair, starting to eat    4/3/24: pt seen and examined, feels  better, flank pain improved    4/4/24 :pt transferred out of icu, feels better but still weak, appetite improving, no cp or sob    4/5: pt seen and examined, still co frequency, no nausea or vomiting     PROBLEM LIST:    Patient Active Problem List   Diagnosis    Diverticulitis    Diverticulitis of colon    Septic shock (HCC)        PAST MEDICAL HISTORY:    Past Medical History:   Diagnosis Date    Headache     Hypertension        DIET:    ADULT DIET; Regular     PHYSICAL EXAM:     Patient Vitals for the past 24 hrs:   BP Temp Temp src Pulse Resp SpO2   04/05/24 1205 130/70 97.4 °F (36.3 °C) Axillary 72 16 --   04/05/24 0815 (!) 146/68 98 °F (36.7 °C) Axillary 66 18 97 %   04/05/24 0530 (!) 164/75 97.6 °F (36.4 °C) Axillary 60 16 95 %   04/04/24 2320 129/76 98.2 °F (36.8 °C) Axillary 62 16 96 %   04/04/24 1904  04/04/2024    LABALBU 3.6 04/03/2024     Lab Results   Component Value Date    TSH 2.670 03/19/2021       Iron Studies  No results found for: \"IRON\", \"TIBC\", \"FERRITIN\"  No results found for: \"ARWXMFUF76\"  No results found for: \"FOLATE\"    Vit D, 25-Hydroxy   Date Value Ref Range Status   04/05/2024 34.5 30.0 - 100.0 ng/mL Final     No results found for: \"PTH\"    No components found for: \"URIC\"    Lab Results   Component Value Date/Time    COLORU Yellow 03/31/2024 06:39 PM    NITRU POSITIVE 03/31/2024 06:39 PM    GLUCOSEU NEGATIVE 03/31/2024 06:39 PM    KETUA NEGATIVE 03/31/2024 06:39 PM    UROBILINOGEN 0.2 03/31/2024 06:39 PM    BILIRUBINUR NEGATIVE 03/31/2024 06:39 PM       No results found for: \"LIPIDPAN\"      IMPRESSION/RECOMMENDATIONS:      Septic shock  E coli bacteremia  UTI with L sided obstruction L urolithiasis sp stent 3/31/24  On ceftriaxone  500 ml urine after stent placed  Uo?     2. Arf  In setting of septic shock and l sided hydro  Sp stent  Uo good  Follow uo  Cr 1.2>0.8>0.7>0.6     3. Acute metabolic acidosis non gap  With sepsis and arf  Start oral hco3 if stays <20  Lactate 2.5>4.7>2.4     4. Nephrolithiasis  No history  Was on ca and vit d  Ca 10.1 on admission > 8.1  Pth 115 (vit d 28 p 3.5 and ca 10.1 at the time)>>> 38  Pthrp p  ca and vit d supplement  restarted by endo  H/o poor absorption of ca vit d with gastric bypass  If ca rises again as on admission will need to hold      Carrillo Diaz MD

## 2024-04-06 NOTE — DISCHARGE SUMMARY
[place] [id number]                            DISCHARGE SUMMARY      PATIENT NAME: ALIREZA GONZALEZ            : 1950  MED REC NO: [id number]                        ROOM: 0629  ACCOUNT NO: [id number]                       ADMIT DATE: 2024  PROVIDER: Rob Lott DO      FINAL DIAGNOSES:  Left hydronephrosis; left ureteral calculi; complicated urinary tract infection; sepsis secondary to above; E coli bacteremia; herpes labialis; hypertension; paroxysmal atrial tachycardia; secondary hyperparathyroidism.    HOSPITAL COURSE:  The patient is a 73-year-old white female presented with a history and chief complaint of intractable nausea, vomiting, abdominal pain, was found on CT scan to have a left ureteral calculi, hydronephrosis, initially a low white count, but certainly appeared to be septic; was given some Levophed, pressors, and Solu-Cortef and was taken to the OR to have a stent placed and drained this kidney.  She tolerated procedure quite well.  Remained in the intensive care unit for several days, appropriately treated with antibiotics per Infectious Disease.  White count remained elevated, however, tapered down quickly after Solu-Cortef dose was reduced.  Pressors were stopped.  She did have a reflex atrial tachycardia after stopping her beta-blockers.  These have been reintroduced.  She did complain of some swelling of her legs following surgery.  Her hydrochlorothiazide has been reintroduced.  She developed cold sores all over her mouth and was treated appropriately with Valtrex.  Does appear to be some in the left lower eyelid, but not really involving the conjunctiva or the cornea.  She was given some cream for lips and was told that she could put a slight application on her eyelid.  She eventually did have E coli grow out from her blood.  Again, appropriate antibiotics were used and she was able to be

## 2024-04-08 LAB — PTH RELATED PEPTIDE: <2 PMOL/L (ref 0–3.4)

## 2024-05-01 ENCOUNTER — OFFICE VISIT (OUTPATIENT)
Dept: ENDOCRINOLOGY | Age: 74
End: 2024-05-01
Payer: MEDICARE

## 2024-05-01 VITALS
HEART RATE: 52 BPM | OXYGEN SATURATION: 98 % | WEIGHT: 176 LBS | DIASTOLIC BLOOD PRESSURE: 73 MMHG | BODY MASS INDEX: 33.23 KG/M2 | HEIGHT: 61 IN | RESPIRATION RATE: 18 BRPM | SYSTOLIC BLOOD PRESSURE: 135 MMHG

## 2024-05-01 DIAGNOSIS — E55.9 VITAMIN D DEFICIENCY: ICD-10-CM

## 2024-05-01 DIAGNOSIS — E21.0 PRIMARY HYPERPARATHYROIDISM (HCC): Primary | ICD-10-CM

## 2024-05-01 DIAGNOSIS — Z98.84 S/P GASTRIC BYPASS: ICD-10-CM

## 2024-05-01 PROCEDURE — G8427 DOCREV CUR MEDS BY ELIG CLIN: HCPCS | Performed by: INTERNAL MEDICINE

## 2024-05-01 PROCEDURE — 1123F ACP DISCUSS/DSCN MKR DOCD: CPT | Performed by: INTERNAL MEDICINE

## 2024-05-01 PROCEDURE — 99214 OFFICE O/P EST MOD 30 MIN: CPT | Performed by: INTERNAL MEDICINE

## 2024-05-01 PROCEDURE — 1036F TOBACCO NON-USER: CPT | Performed by: INTERNAL MEDICINE

## 2024-05-01 PROCEDURE — 3017F COLORECTAL CA SCREEN DOC REV: CPT | Performed by: INTERNAL MEDICINE

## 2024-05-01 PROCEDURE — G8417 CALC BMI ABV UP PARAM F/U: HCPCS | Performed by: INTERNAL MEDICINE

## 2024-05-01 PROCEDURE — 1111F DSCHRG MED/CURRENT MED MERGE: CPT | Performed by: INTERNAL MEDICINE

## 2024-05-01 PROCEDURE — G8400 PT W/DXA NO RESULTS DOC: HCPCS | Performed by: INTERNAL MEDICINE

## 2024-05-01 PROCEDURE — 1090F PRES/ABSN URINE INCON ASSESS: CPT | Performed by: INTERNAL MEDICINE

## 2024-05-01 NOTE — PROGRESS NOTES
[facility] [place] [facility]  [address], [address]  [facility] [address]  Dept: [phone]  Loc: [phone]      Date of Service: 5/1/2024  Primary Care Physician: Rob Lott DO  provider: Jay Sánchez MD     Reason for the consultation:  Hyperparathyroidism    History of Present Illness:  The history is provided by the patient. Accuracy of the patient data is excellent.  Mary Luu is a very pleasant 73 y.o. old female with PMH of HTN, obesity s/p gastric bypass surgery, vitD deficiency and other listed below seen today for evaluation and management of hyperparathyroidism      10/14/23 05:40 03/31/24 18:39 04/01/24 00:34 04/01/24 04:20 04/02/24 05:12   Creatinine 0.7 0.9 1.2 (H) 1.2 (H) 0.8   Est, Glom Filt Rate >60 64 49 (L) 46 (L) 82   CALCIUM, SERUM 8.6 10.1 8.1 (L) 8.1 (L) 9.1   Albumin 3.0 (L) 4.2 3.2 (L) 3.1 (L) 3.9   Pth Intact     115.1 (H)   Vit D, 25-Hydroxy     28.9 (L)     The patient underwent gastric bypass surgery in 2011 and was able to lost ~ 100 Ibs.     We started her on a vitamin D and calcium supplements and the PTH normalized to 38.7    The patient reported fatigue and tiredness but denied history of polydipsia, polyuria, nausea, vomiting, muscle weakness, bone pain, confusion, unintentional weight loss, fragility fractures, nephrolithiasis, osteoporosis or peptic ulcer disease    The patient has no family history of hypercalcemia and has not been on any medications known to cause hypercalcemia.     The patient is up to date on age appropriate cancer screens including colonoscopy     Past Medical History   Past Medical History:   Diagnosis Date    Headache     Hypertension        Past Surgical History   Past Surgical History:   Procedure Laterality Date    BREAST SURGERY      CYSTOSCOPY W/ URETERAL STENT PLACEMENT Left 03/31/2024    GASTRIC BYPASS SURGERY  2011    HYSTERECTOMY (CERVIX STATUS UNKNOWN)      Total    JOINT REPLACEMENT

## 2024-05-10 ENCOUNTER — HOSPITAL ENCOUNTER (OUTPATIENT)
Age: 74
End: 2024-05-10
Payer: MEDICARE

## 2024-05-10 ENCOUNTER — HOSPITAL ENCOUNTER (OUTPATIENT)
Dept: GENERAL RADIOLOGY | Age: 74
End: 2024-05-10
Payer: MEDICARE

## 2024-05-10 DIAGNOSIS — N20.0 KIDNEY STONE: ICD-10-CM

## 2024-05-10 PROCEDURE — 74018 RADEX ABDOMEN 1 VIEW: CPT

## 2024-05-21 ENCOUNTER — HOSPITAL ENCOUNTER (OUTPATIENT)
Age: 74
Discharge: HOME | End: 2024-05-21
Payer: MEDICARE

## 2024-05-21 DIAGNOSIS — Z98.84 S/P GASTRIC BYPASS: ICD-10-CM

## 2024-05-21 DIAGNOSIS — E55.9 VITAMIN D DEFICIENCY: ICD-10-CM

## 2024-05-21 DIAGNOSIS — E21.0 PRIMARY HYPERPARATHYROIDISM (HCC): ICD-10-CM

## 2024-05-21 LAB
25(OH)D3 SERPL-MCNC: 52.7 NG/ML (ref 30–100)
ALBUMIN SERPL-MCNC: 4 G/DL (ref 3.5–5.2)
ANION GAP SERPL CALCULATED.3IONS-SCNC: 10 MMOL/L (ref 7–16)
BUN SERPL-MCNC: 15 MG/DL (ref 6–23)
CALCIUM SERPL-MCNC: 9.8 MG/DL (ref 8.6–10.2)
CHLORIDE SERPL-SCNC: 105 MMOL/L (ref 98–107)
CO2 SERPL-SCNC: 27 MMOL/L (ref 22–29)
CREAT SERPL-MCNC: 0.7 MG/DL (ref 0.5–1)
GFR, ESTIMATED: >90 ML/MIN/1.73M2
GLUCOSE SERPL-MCNC: 91 MG/DL (ref 74–99)
POTASSIUM SERPL-SCNC: 4 MMOL/L (ref 3.5–5)
PTH-INTACT SERPL-MCNC: 54 PG/ML (ref 15–65)
SODIUM SERPL-SCNC: 142 MMOL/L (ref 132–146)

## 2024-05-21 PROCEDURE — 36415 COLL VENOUS BLD VENIPUNCTURE: CPT

## 2024-05-21 PROCEDURE — 82040 ASSAY OF SERUM ALBUMIN: CPT

## 2024-05-21 PROCEDURE — 83970 ASSAY OF PARATHORMONE: CPT

## 2024-05-21 PROCEDURE — 82306 VITAMIN D 25 HYDROXY: CPT

## 2024-05-21 PROCEDURE — 80048 BASIC METABOLIC PNL TOTAL CA: CPT

## 2024-08-14 ENCOUNTER — HOSPITAL ENCOUNTER (OUTPATIENT)
Age: 74
Discharge: HOME | End: 2024-08-16

## 2024-08-14 PROCEDURE — 87077 CULTURE AEROBIC IDENTIFY: CPT

## 2024-08-15 LAB
HELIOBACTER PYLORI ID: NEGATIVE
SOURCE, 60200063: NORMAL

## 2024-08-21 LAB — SURGICAL PATHOLOGY REPORT: NORMAL

## 2025-06-15 ENCOUNTER — TRANSCRIBE ORDERS (OUTPATIENT)
Dept: ADMINISTRATIVE | Age: 75
End: 2025-06-15

## 2025-06-15 DIAGNOSIS — R39.14 FEELING OF INCOMPLETE BLADDER EMPTYING: ICD-10-CM

## 2025-06-15 DIAGNOSIS — N39.0 URINARY TRACT INFECTION WITHOUT HEMATURIA, SITE UNSPECIFIED: Primary | ICD-10-CM

## (undated) DEVICE — SOLUTION SCRB 4OZ 4% CHG H2O AIDED FOR PREOPERATIVE SKIN

## (undated) DEVICE — HOSE CONN FOR WST MGMT SYS NEPTUNE 2

## (undated) DEVICE — SOLUTION IRRIG 3000ML 0.9% SOD CHL USP UROMATIC PLAS CONT

## (undated) DEVICE — DRAINBAG,ANTI-REFLUX TOWER,L/F,2000ML,LL: Brand: MEDLINE

## (undated) DEVICE — SOLUTION IRRIG 1000ML STRL H2O USP PLAS POUR BTL

## (undated) DEVICE — SYRINGE MED 30ML STD CLR PLAS LUERLOCK TIP N CTRL DISP

## (undated) DEVICE — CYSTO PACK: Brand: MEDLINE INDUSTRIES, INC.

## (undated) DEVICE — GOWN,SIRUS,FABRNF,XL,20/CS: Brand: MEDLINE

## (undated) DEVICE — BAG DRNGE COMB PK

## (undated) DEVICE — GUIDEWIRE ENDOSCP L150CM DIA0.035IN TIP 3CM PTFE NIT

## (undated) DEVICE — MEDICINE CUP, GRADUATED, STER: Brand: MEDLINE

## (undated) DEVICE — CATHETER URET 5FR L70CM OPN END SGL LUMN INJ HUB FLEXIMA

## (undated) DEVICE — GAUZE,SPONGE,4"X4",8PLY,STRL,LF,10/TRAY: Brand: MEDLINE

## (undated) DEVICE — 4-PORT MANIFOLD: Brand: NEPTUNE 2

## (undated) DEVICE — GLOVE ORANGE PI 7 1/2   MSG9075

## (undated) DEVICE — CATHETER F BLLN 5CC 16FR 2 W HYDRGEL COAT LESS TRAUM LUB

## (undated) DEVICE — GUIDEWIRE UROLOGICAL STR STD 0.035 IN X150 CM (QTY = BOX OF 5)

## (undated) DEVICE — TUBING, SUCTION, 3/16" X 12', STRAIGHT: Brand: MEDLINE